# Patient Record
Sex: MALE | Race: WHITE | NOT HISPANIC OR LATINO | Employment: OTHER | URBAN - METROPOLITAN AREA
[De-identification: names, ages, dates, MRNs, and addresses within clinical notes are randomized per-mention and may not be internally consistent; named-entity substitution may affect disease eponyms.]

---

## 2017-03-30 ENCOUNTER — HOSPITAL ENCOUNTER (OUTPATIENT)
Dept: RADIOLOGY | Facility: HOSPITAL | Age: 67
Discharge: HOME | End: 2017-03-30
Attending: ORTHOPAEDIC SURGERY
Payer: MEDICARE

## 2017-03-30 DIAGNOSIS — M25.562 LEFT KNEE PAIN: ICD-10-CM

## 2017-03-30 PROCEDURE — 73564 X-RAY EXAM KNEE 4 OR MORE: CPT | Mod: TC,LT

## 2017-04-03 ENCOUNTER — HOSPITAL ENCOUNTER (OUTPATIENT)
Dept: RADIOLOGY | Facility: HOSPITAL | Age: 67
Discharge: HOME | End: 2017-04-03
Attending: ORTHOPAEDIC SURGERY
Payer: MEDICARE

## 2017-04-03 DIAGNOSIS — M17.12 UNILATERAL PRIMARY OSTEOARTHRITIS, LEFT KNEE: ICD-10-CM

## 2017-04-03 PROCEDURE — 73721 MRI JNT OF LWR EXTRE W/O DYE: CPT | Mod: TC,LT

## 2017-04-03 PROCEDURE — 73721 MRI JNT OF LWR EXTRE W/O DYE: CPT | Mod: 26,LT,, | Performed by: RADIOLOGY

## 2017-05-02 ENCOUNTER — HOSPITAL ENCOUNTER (OUTPATIENT)
Dept: PREADMISSION TESTING | Facility: HOSPITAL | Age: 67
Discharge: HOME | End: 2017-05-02
Attending: ORTHOPAEDIC SURGERY
Payer: MEDICARE

## 2017-05-02 ENCOUNTER — ANESTHESIA EVENT (OUTPATIENT)
Dept: SURGERY | Facility: HOSPITAL | Age: 67
End: 2017-05-02
Payer: MEDICARE

## 2017-05-02 VITALS — WEIGHT: 276 LBS | BODY MASS INDEX: 39.51 KG/M2 | HEIGHT: 70 IN

## 2017-05-02 NOTE — DISCHARGE INSTRUCTIONS
[facility]  Pre Admit Instructions    Day and Date of Procedure: Thursday 5-      · Call your doctor if you become ill before your surgery  · Someone will call you between 1 p.m. And 5 p.m.the workday before the procedure to give you an arrival time       - Before 7 a.m. Enter through Emergency Room       - 7 a.m. To 5 p.m. Enter through Patient Registration Main Lobby  · You must have a responsible  to bring you home    Do NOT eat or drink anything   past midnight before your procedure day    Please    · Do not wear makeup, jewelry, nail polish or body piercings  · Bring containers/solution for contacts, dentures, bridges - these and hearing aids will be removed before your procedure  · Do not bring cash, jewelry or valuables the day of your procedure   · No smoking at least 24 hours before your procedure  · Wear clothing that is comfortable and easy to take off and put on  · Do NOT shave for at least 5 days before your surgery    Review skin preparation handout before using.                 Information about your stay (Please Review)    Before Surgery  1. Cafeteria Meals: 7am to 10am; 11am to 1:30 pm; Dinner/Supper must may be ordered between 11:00 am and 4 pm from the [facility] After [facility] Menu. Food will be available to  between 5 pm and 6 pm. The kitchen phone extension is [phone].  2. Your doctor may order and review labs, x-rays, ECG or other tests as a pre-surgery workup and will call you if there is need for follow up.  3. No smoking inside or outside the hospital on hospital grounds.  4. Wear clothing that is easy to take off and put on.  The hospital will provide you with a gown.  5. You may bring robe, slippers, nightwear, and toiletries (toothbrush, toothpaste, makeup).  6. If your doctor orders a Fleets Enema or other prep, follow package and/or doctors orders.  7. Brush your teeth and rinse your mouth the morning of surgery, but dont swallow the water.  8. The nurse  will ask questions and check your condition.  The doctor may ryder your surgical site.  9. Compression boots may be put on your calves to reduce the risk of blood clots.  10. The doctor may order medicine to help you relax before surgery.  After Surgery  1. The nurse will check your temperature, breathing, blood pressure, heart rate, IV site, and surgery site.  2. A diet will be ordered-most start with ice chips and then advance slowly to other foods.  3. If you have IV fluids the IV pump will beep to let the nurse know that she needs to check it.  4. You may have a urinary catheter and staff may measure your oral intake and urine output.  5. Pain medication may be ordered by the doctor after surgery.  If you have a pain management device tell your caretakers not to press the button because of OVERDOSE RISK.  6. When the nurse or doctor tells you it is okay to get out of bed, ask for help until you are stable.  7. The nurse may ask you to turn, cough, and deep breathe to prevent lung problems.  You can use a pillow to hold your incision when you deep breathe or cough to reduce pain.  8. The nurse will give you discharge instructions--incision care, symptoms to report to your doctor, and your follow-up appointment when you are discharged.  You cannot drive yourself home.  Goal for Discharge from One Day Surgery  · Control pain with an oral medication  · Walk without feeling dizzy or weak  · Tolerate liquids well  · Urinate without difficulty    Things you can do to  Reduce the Risk of Infections or Complications  Wash Hands and use Waterless Hand Sanitizers  · Wash hands frequently with soap and warm water for at least 15 seconds.   · Use hand sanitizers (alcohol based) often at home and in public if hands are not visibly soiled  Take Antibiotic Exactly as Prescribed  · Do not stop antibiotics too soon; you risk developing infection resistant to antibiotics  · Take your antibiotic even if you are feeling better and  even if they upset your stomach  · Call the doctor if you cant tolerate the antibiotic or you have an allergic reaction  Stay Healthy  · Take medicines as prescribed by your doctor  · Keep your diabetes under control - diet and medication  · Get enough rest, exercise and eat a healthy diet  Keep the Wound Clean and Dry  · Wash hands before and after taking care of the incision (cut)  · Wash hands when you remove a dressing, before you touch/apply a new dressing  · Shower and clean incision with antibacterial soap and rinse well if the doctor approves  · Allow the cut to dry completely before putting on a clean dressing  · Do not touch the part of the bandage that will cover the incision  · Do not use ointments unless your doctor tells you to-can promote bacterial growth  · If ordered, put ointment directly on the dressing-do not touch the end of the tube  · Do not scrub, remove scabs, or leave a damp dressing on the incision  · Do not use peroxide or alcohol to clean the incision unless the doctor tells you to   · Do not let children, pets or anyone else contaminate the incision  Stop Smoking To Prevent Infection  · Stop smoking-Centers for Disease Control recommends 30 days before surgery  · Smokers get more infections after surgery-studies have shown 6 times the risk  · Smokers have more scarring and heal slower-open wounds get infected easier  Prevent Respiratory complications  · Stop smoking  · Turn, cough, and deep breathe even if you have some pain when you do so.  · Splint your incision with a pillow when you cough/deep breath, to help control pain.  · Do not lie in one position for long periods of time.   Prevent Blood Clots  · When you wake move your legs, flex your feet, rotate your ankles, wiggle your toes  · Get up when the doctor says its ok.  Dangle your feet from the side of the bed  · Report symptoms-leg pain, redness/swelling, warm to touch; fever; shortness of breath, chest pain, severe upper  back pain.

## 2017-05-02 NOTE — ANESTHESIA PREPROCEDURE EVALUATION
05/02/2017  Daniel Miller is a 66 y.o., male.    Anesthesia Evaluation    I have reviewed the Patient Summary Reports.    I have reviewed the Nursing Notes.   I have reviewed the Medications.     Review of Systems  Anesthesia Hx:  No problems with previous Anesthesia    Social:  Non-Smoker, No Alcohol Use    Hematology/Oncology:  Hematology Normal   Oncology Normal     EENT/Dental:EENT/Dental Normal   Cardiovascular:   Exercise tolerance: good Hypertension, well controlled  Functional Capacity good / => 4 METS  Carotoid Artery Disease, s/p carotid endarectomy , Right stenosis is  s/p CEA%   Pulmonary:   Sleep Apnea, CPAP    Musculoskeletal:   Arthritis     Neurological:   CVA, no residual symptoms    Endocrine:   Diabetes, well controlled, type 2        Physical Exam  General:  Morbid Obesity    Airway/Jaw/Neck:  Airway Findings: Mouth Opening: Normal Tongue: Normal  General Airway Assessment: Adult  Mallampati: I  TM Distance: Normal, at least 6 cm      Dental:  Dental Findings: Upper Dentures, Lower Dentures        Mental Status:  Mental Status Findings:  Cooperative         Anesthesia Plan  Type of Anesthesia, risks & benefits discussed:  Anesthesia Type:  general  Patient's Preference:   Intra-op Monitoring Plan: standard ASA monitors  Intra-op Monitoring Plan Comments:   Post Op Pain Control Plan:   Post Op Pain Control Plan Comments:   Induction:   IV  Beta Blocker:  Patient is on a Beta-Blocker and has received one dose within the past 24 hours (No further documentation required).       Informed Consent: Patient understands risks and agrees with Anesthesia plan.  Questions answered. Anesthesia consent signed with patient.  ASA Score: 3     Day of Surgery Review of History & Physical: I have interviewed and examined the patient. I have reviewed the patient's H&P dated: 5/11/17. There are no  significant changes.  H&P update referred to the surgeon.         Ready For Surgery From Anesthesia Perspective.

## 2017-05-02 NOTE — IP AVS SNAPSHOT
[address] [address]  Phone: [phone]           Patient Discharge Instructions   Our goal is to set you up for success. This packet includes information on your condition, medications, and your home care.  It will help you care for yourself to prevent having to return to the hospital.     Please ask your nurse if you have any questions.      There are many details to remember when preparing to leave the hospital. Here is what you will need to do:    1. Take your medicine. If you are prescribed medications, review your Medication List on the following pages. You may have new medications to  at the pharmacy and others that you'll need to stop taking. Review the instructions for how and when to take your medications. Talk with your doctor or nurses if you are unsure of what to do.     2. Go to your follow-up appointments. Specific follow-up information is listed in the following pages. Your may be contacted by a nurse or clinical provider about future appointments. Be sure we have all of the phone numbers to reach you. Please contact your provider's office if you are unable to make an appointment.     3. Watch for warning signs. Your doctor or nurse will give you detailed warning signs to watch for and when to call for assistance. These instructions may also include educational information about your condition. If you experience any of warning signs to your health, call your doctor.               ** Verify the list of medication(s) below is accurate and up to date. Carry this with you in case of emergency. If your medications have changed, please notify your healthcare provider.             Medication List      TAKE these medications        Additional Info                      allopurinol 300 MG tablet   Commonly known as:  ZYLOPRIM   Refills:  5   Dose:  300 mg    Instructions:  Take 300 mg by mouth once daily.     Begin Date    AM    Noon    PM    Bedtime        aspirin 81 MG EC tablet   Commonly known as:  ECOTRIN   Refills:  0   Dose:  81 mg    Instructions:  Take 81 mg by mouth once daily.     Begin Date    AM    Noon    PM    Bedtime       fish oil-omega-3 fatty acids 300-1,000 mg capsule   Refills:  0   Dose:  2 g    Instructions:  Take 2 g by mouth 2 (two) times daily.     Begin Date    AM    Noon    PM    Bedtime       folic acid 1 MG tablet   Commonly known as:  FOLVITE   Refills:  4   Dose:  1000 mcg    Instructions:  Take 1,000 mcg by mouth once daily.     Begin Date    AM    Noon    PM    Bedtime       furosemide 20 MG tablet   Commonly known as:  LASIX   Refills:  5   Dose:  20 mg    Instructions:  Take 20 mg by mouth 2 (two) times daily.     Begin Date    AM    Noon    PM    Bedtime       gabapentin 300 MG capsule   Commonly known as:  NEURONTIN   Refills:  0   Dose:  300 mg    Instructions:  Take 300 mg by mouth 3 (three) times daily.     Begin Date    AM    Noon    PM    Bedtime       glimepiride 2 MG tablet   Commonly known as:  AMARYL   Refills:  0   Dose:  2 mg    Instructions:  Take 2 mg by mouth before breakfast.     Begin Date    AM    Noon    PM    Bedtime       meclizine 25 mg tablet   Commonly known as:  ANTIVERT   Refills:  3   Dose:  25 mg    Instructions:  Take 25 mg by mouth every evening.     Begin Date    AM    Noon    PM    Bedtime       metoprolol succinate 100 MG 24 hr tablet   Commonly known as:  TOPROL-XL   Refills:  0   Dose:  100 mg   What changed:  Another medication with the same name was removed. Continue taking this medication, and follow the directions you see here.    Instructions:  Take 100 mg by mouth once daily.     Begin Date    AM    Noon    PM    Bedtime       pantoprazole 40 MG tablet   Commonly known as:  PROTONIX   Refills:  0   Dose:  40 mg    Instructions:  Take 40 mg by mouth once daily.     Begin Date    AM    Noon    PM    Bedtime       phenazopyridine 100 MG tablet   Commonly known as:  PYRIDIUM   Quantity:  20  tablet   Refills:  0   Dose:  100 mg    Instructions:  Take 1 tablet (100 mg total) by mouth 3 (three) times daily with meals.     Begin Date    AM    Noon    PM    Bedtime       potassium citrate 15 mEq Tbsr   Quantity:  60 tablet   Refills:  6   Dose:  1 tablet    Instructions:  Take 1 tablet by mouth 2 (two) times daily with meals.     Begin Date    AM    Noon    PM    Bedtime       pramipexole 0.125 MG tablet   Commonly known as:  MIRAPEX   Refills:  5   Dose:  0.125 mg    Instructions:  Take 0.125 mg by mouth nightly as needed.     Begin Date    AM    Noon    PM    Bedtime       ramipril 2.5 MG capsule   Commonly known as:  ALTACE   Refills:  5   Dose:  2.5 mg    Instructions:  Take 2.5 mg by mouth 2 (two) times daily.     Begin Date    AM    Noon    PM    Bedtime       rosuvastatin 20 MG tablet   Commonly known as:  CRESTOR   Refills:  0   Dose:  20 mg    Instructions:  Take 20 mg by mouth once daily.     Begin Date    AM    Noon    PM    Bedtime       SLOW-MAG ORAL   Refills:  0   Dose:  1 tablet    Instructions:  Take 1 tablet by mouth once daily.     Begin Date    AM    Noon    PM    Bedtime       tamsulosin 0.4 mg Cp24   Commonly known as:  FLOMAX   Quantity:  30 capsule   Refills:  5   Dose:  0.4 mg    Instructions:  Take 1 capsule (0.4 mg total) by mouth once daily.     Begin Date    AM    Noon    PM    Bedtime       tramadol 50 mg tablet   Commonly known as:  ULTRAM   Quantity:  120 tablet   Refills:  1    Instructions:  TAKE 1 TABLET BY MOUTH EVERY 6 HOURS AS NEEDED FOR PAIN     Begin Date    AM    Noon    PM    Bedtime                  Please bring to all follow up appointments:    1. A copy of your discharge instructions.  2. All medicines you are currently taking in their original bottles.  3. Identification and insurance card.    Please arrive 15 minutes ahead of scheduled appointment time.    Please call 24 hours in advance if you must reschedule your appointment and/or time.        Your Scheduled  Appointments     Sep 05, 2017  3:05 PM CDT   Non-Fasting Lab with LAB, [facility] REYES Brown Jo [facility] ([facility])    [address] [facility] [address]   [phone]            Sep 05, 2017  3:30 PM CDT   Established Patient Visit with MD Reyes Arriola [facility] ([facility])    [address]  [facility] [id number] [phone]              Your Future Surgeries/Procedures     May 11, 2017   Surgery with Dorian Be MD   [facility] ([facility])    [address] [place] [id number] [phone]                  Discharge Instructions       [facility]  Pre Admit Instructions    Day and Date of Procedure: Thursday 5-      · Call your doctor if you become ill before your surgery  · Someone will call you between 1 p.m. And 5 p.m.the workday before the procedure to give you an arrival time       - Before 7 a.m. Enter through Emergency Room       - 7 a.m. To 5 p.m. Enter through Patient Registration Main Lobby  · You must have a responsible  to bring you home    Do NOT eat or drink anything   past midnight before your procedure day    Please    · Do not wear makeup, jewelry, nail polish or body piercings  · Bring containers/solution for contacts, dentures, bridges - these and hearing aids will be removed before your procedure  · Do not bring cash, jewelry or valuables the day of your procedure   · No smoking at least 24 hours before your procedure  · Wear clothing that is comfortable and easy to take off and put on  · Do NOT shave for at least 5 days before your surgery    Review skin preparation handout before using.                 Information about your stay (Please Review)    Before Surgery  1. Cafeteria Meals: 7am to 10am; 11am to 1:30 pm; Dinner/Supper must may be ordered between 11:00 am and 4 pm from the [facility] Menu. Food will be available to   between 5 pm and 6 pm. The kitchen phone extension is [phone].  2. Your doctor may order and review labs, x-rays, ECG or other tests as a pre-surgery workup and will call you if there is need for follow up.  3. No smoking inside or outside the hospital on hospital grounds.  4. Wear clothing that is easy to take off and put on.  The hospital will provide you with a gown.  5. You may bring robe, slippers, nightwear, and toiletries (toothbrush, toothpaste, makeup).  6. If your doctor orders a Fleets Enema or other prep, follow package and/or doctors orders.  7. Brush your teeth and rinse your mouth the morning of surgery, but dont swallow the water.  8. The nurse will ask questions and check your condition.  The doctor may ryder your surgical site.  9. Compression boots may be put on your calves to reduce the risk of blood clots.  10. The doctor may order medicine to help you relax before surgery.  After Surgery  1. The nurse will check your temperature, breathing, blood pressure, heart rate, IV site, and surgery site.  2. A diet will be ordered-most start with ice chips and then advance slowly to other foods.  3. If you have IV fluids the IV pump will beep to let the nurse know that she needs to check it.  4. You may have a urinary catheter and staff may measure your oral intake and urine output.  5. Pain medication may be ordered by the doctor after surgery.  If you have a pain management device tell your caretakers not to press the button because of OVERDOSE RISK.  6. When the nurse or doctor tells you it is okay to get out of bed, ask for help until you are stable.  7. The nurse may ask you to turn, cough, and deep breathe to prevent lung problems.  You can use a pillow to hold your incision when you deep breathe or cough to reduce pain.  8. The nurse will give you discharge instructions--incision care, symptoms to report to your doctor, and your follow-up appointment when you are discharged.  You cannot drive  yourself home.  Goal for Discharge from One Day Surgery  · Control pain with an oral medication  · Walk without feeling dizzy or weak  · Tolerate liquids well  · Urinate without difficulty    Things you can do to  Reduce the Risk of Infections or Complications  Wash Hands and use Waterless Hand Sanitizers  · Wash hands frequently with soap and warm water for at least 15 seconds.   · Use hand sanitizers (alcohol based) often at home and in public if hands are not visibly soiled  Take Antibiotic Exactly as Prescribed  · Do not stop antibiotics too soon; you risk developing infection resistant to antibiotics  · Take your antibiotic even if you are feeling better and even if they upset your stomach  · Call the doctor if you cant tolerate the antibiotic or you have an allergic reaction  Stay Healthy  · Take medicines as prescribed by your doctor  · Keep your diabetes under control - diet and medication  · Get enough rest, exercise and eat a healthy diet  Keep the Wound Clean and Dry  · Wash hands before and after taking care of the incision (cut)  · Wash hands when you remove a dressing, before you touch/apply a new dressing  · Shower and clean incision with antibacterial soap and rinse well if the doctor approves  · Allow the cut to dry completely before putting on a clean dressing  · Do not touch the part of the bandage that will cover the incision  · Do not use ointments unless your doctor tells you to-can promote bacterial growth  · If ordered, put ointment directly on the dressing-do not touch the end of the tube  · Do not scrub, remove scabs, or leave a damp dressing on the incision  · Do not use peroxide or alcohol to clean the incision unless the doctor tells you to   · Do not let children, pets or anyone else contaminate the incision  Stop Smoking To Prevent Infection  · Stop smoking-Centers for Disease Control recommends 30 days before surgery  · Smokers get more infections after surgery-studies have shown 6  "times the risk  · Smokers have more scarring and heal slower-open wounds get infected easier  Prevent Respiratory complications  · Stop smoking  · Turn, cough, and deep breathe even if you have some pain when you do so.  · Splint your incision with a pillow when you cough/deep breath, to help control pain.  · Do not lie in one position for long periods of time.   Prevent Blood Clots  · When you wake move your legs, flex your feet, rotate your ankles, wiggle your toes  · Get up when the doctor says its ok.  Dangle your feet from the side of the bed  · Report symptoms-leg pain, redness/swelling, warm to touch; fever; shortness of breath, chest pain, severe upper back pain.        Admission Information     Date & Time Provider Department CSN    5/2/2017 12:00 PM Dorian Be MD [facility] [id number]      Care Providers     Provider Role Specialty Primary office phone    Dorian Be MD Attending Provider Orthopedic Surgery [phone]      Your Vitals Were     Height Weight BMI          5' 10" (1.778 m) 125.2 kg (276 lb) 39.6 kg/m2        Recent Lab Values     No lab values to display.      Allergies as of 5/2/2017        Reactions    Lipitor [Atorvastatin] Hives      [facility]     [facility] Nurse Care Line - 24/7 Assistance  Unless otherwise directed by your provider, please contact [facility] On[facility], our nurse care line that is available for 24/7 assistance.     Registered nurses in the [facility] Center provide clinical advisement, health education, appointment booking, and other advisory services.  Call for this free service at [phone].        Advance Directives     An advance directive is a document which, in the event you are no longer able to make decisions for yourself, tells your healthcare team what kind of treatment you do or do not want to receive, or who you would like to make those decisions for you.  If you do not currently have an advance directive, [facility] " encourages you to create one.  For more information call:  [phone]), [phone] ([phone]),  or log on to [email or web address].        Language Assistance Services     ATTENTION: Language assistance services are available, free of charge. Please call [phone].      ATENCIÓN: Si habla abdulkadir, tiene a ramirez disposición servicios gratuitos de asistencia lingüística. Llame al [phone].     [facility] Ý: N?u b?n nói Ti?ng Vi?t, có các d?ch v? h? tr? ngôn ng? mi?n phí dành cho b?n. G?i s? [phone].        Chronic Kindey Disease Education             [facility] Sign-Up     Activating your [facility] account is as easy as 1-2-3!     1) Visit [email or web address], select Sign Up Now, enter this activation code and your date of birth, then select Next.  [email or web address]  Expires: 6/16/2017 12:00 PM      2) Create a username and password to use when you visit [facility] in the future and select a security question in case you lose your password and select Next.    3) Enter your e-mail address and click Sign Up!    Additional Information  If you have questions, please e-mail [email or web address] or call [phone] to talk to our [facility] staff. Remember, [facility] is NOT to be used for urgent needs. For medical emergencies, dial 911.          [facility] complies with applicable Federal civil rights laws and does not discriminate on the basis of race, color, national origin, age, disability, or sex.

## 2017-05-11 ENCOUNTER — ANESTHESIA (OUTPATIENT)
Dept: SURGERY | Facility: HOSPITAL | Age: 67
End: 2017-05-11
Payer: MEDICARE

## 2017-05-11 ENCOUNTER — HOSPITAL ENCOUNTER (OUTPATIENT)
Facility: HOSPITAL | Age: 67
Discharge: HOME | End: 2017-05-11
Attending: ORTHOPAEDIC SURGERY | Admitting: ORTHOPAEDIC SURGERY
Payer: MEDICARE

## 2017-05-11 DIAGNOSIS — S83.232A COMPLEX TEAR OF MEDIAL MENISCUS, CURRENT INJURY, LEFT KNEE, INITIAL ENCOUNTER: Primary | ICD-10-CM

## 2017-05-11 PROCEDURE — 01400 ANES OPN/ARTHRS KNEE JT NOS: CPT | Mod: P3,QZ,QS | Performed by: NURSE ANESTHETIST, CERTIFIED REGISTERED

## 2017-05-11 PROCEDURE — 27000496 *HC LARYNGEAL BLADE (STAH ONLY): Performed by: NURSE ANESTHETIST, CERTIFIED REGISTERED

## 2017-05-11 PROCEDURE — 25000003 PHARM REV CODE 250: Performed by: NURSE ANESTHETIST, CERTIFIED REGISTERED

## 2017-05-11 PROCEDURE — 27200120 HC KIT IV START (RUSH ONLY): Performed by: NURSE ANESTHETIST, CERTIFIED REGISTERED

## 2017-05-11 PROCEDURE — 37000009 HC ANESTHESIA EA ADD 15 MINS: Performed by: ORTHOPAEDIC SURGERY

## 2017-05-11 PROCEDURE — 27201423 OPTIME MED/SURG SUP & DEVICES STERILE SUPPLY: Performed by: ORTHOPAEDIC SURGERY

## 2017-05-11 PROCEDURE — 36000710: Performed by: ORTHOPAEDIC SURGERY

## 2017-05-11 PROCEDURE — 37000008 HC ANESTHESIA 1ST 15 MINUTES: Performed by: ORTHOPAEDIC SURGERY

## 2017-05-11 PROCEDURE — 63600175 PHARM REV CODE 636 W HCPCS: Performed by: NURSE ANESTHETIST, CERTIFIED REGISTERED

## 2017-05-11 PROCEDURE — 36000711: Performed by: ORTHOPAEDIC SURGERY

## 2017-05-11 PROCEDURE — 71000033 HC RECOVERY, INTIAL HOUR: Performed by: ORTHOPAEDIC SURGERY

## 2017-05-11 PROCEDURE — 27000495 *HC ANESTHESIA START UP SUPPLY KIT (STAH ONLY): Performed by: NURSE ANESTHETIST, CERTIFIED REGISTERED

## 2017-05-11 PROCEDURE — 27000494 *HC OXYGEN SET UP (STAH ONLY): Performed by: NURSE ANESTHETIST, CERTIFIED REGISTERED

## 2017-05-11 PROCEDURE — 25000003 PHARM REV CODE 250: Performed by: ORTHOPAEDIC SURGERY

## 2017-05-11 RX ORDER — HYDROCODONE BITARTRATE AND ACETAMINOPHEN 5; 325 MG/1; MG/1
1 TABLET ORAL EVERY 4 HOURS PRN
Status: DISCONTINUED | OUTPATIENT
Start: 2017-05-11 | End: 2017-05-11 | Stop reason: HOSPADM

## 2017-05-11 RX ORDER — MIDAZOLAM HYDROCHLORIDE 1 MG/ML
INJECTION, SOLUTION INTRAMUSCULAR; INTRAVENOUS
Status: DISCONTINUED | OUTPATIENT
Start: 2017-05-11 | End: 2017-05-11

## 2017-05-11 RX ORDER — SUCCINYLCHOLINE CHLORIDE 20 MG/ML
INJECTION INTRAMUSCULAR; INTRAVENOUS
Status: DISCONTINUED | OUTPATIENT
Start: 2017-05-11 | End: 2017-05-11

## 2017-05-11 RX ORDER — PROPOFOL 10 MG/ML
INJECTION, EMULSION INTRAVENOUS
Status: DISCONTINUED | OUTPATIENT
Start: 2017-05-11 | End: 2017-05-11

## 2017-05-11 RX ORDER — SODIUM CHLORIDE, SODIUM LACTATE, POTASSIUM CHLORIDE, CALCIUM CHLORIDE 600; 310; 30; 20 MG/100ML; MG/100ML; MG/100ML; MG/100ML
INJECTION, SOLUTION INTRAVENOUS CONTINUOUS PRN
Status: DISCONTINUED | OUTPATIENT
Start: 2017-05-11 | End: 2017-05-11

## 2017-05-11 RX ORDER — FENTANYL CITRATE 50 UG/ML
INJECTION, SOLUTION INTRAMUSCULAR; INTRAVENOUS
Status: DISCONTINUED | OUTPATIENT
Start: 2017-05-11 | End: 2017-05-11

## 2017-05-11 RX ORDER — BUPIVACAINE HYDROCHLORIDE AND EPINEPHRINE 5; 5 MG/ML; UG/ML
INJECTION, SOLUTION EPIDURAL; INTRACAUDAL; PERINEURAL
Status: DISCONTINUED | OUTPATIENT
Start: 2017-05-11 | End: 2017-05-11 | Stop reason: HOSPADM

## 2017-05-11 RX ORDER — LIDOCAINE HCL/PF 100 MG/5ML
SYRINGE (ML) INTRAVENOUS
Status: DISCONTINUED | OUTPATIENT
Start: 2017-05-11 | End: 2017-05-11

## 2017-05-11 RX ORDER — ONDANSETRON 2 MG/ML
INJECTION INTRAMUSCULAR; INTRAVENOUS
Status: DISCONTINUED | OUTPATIENT
Start: 2017-05-11 | End: 2017-05-11

## 2017-05-11 RX ORDER — ONDANSETRON 2 MG/ML
4 INJECTION INTRAMUSCULAR; INTRAVENOUS EVERY 12 HOURS PRN
Status: DISCONTINUED | OUTPATIENT
Start: 2017-05-11 | End: 2017-05-11 | Stop reason: HOSPADM

## 2017-05-11 RX ORDER — HYDROMORPHONE HYDROCHLORIDE 2 MG/ML
INJECTION, SOLUTION INTRAMUSCULAR; INTRAVENOUS; SUBCUTANEOUS
Status: DISCONTINUED | OUTPATIENT
Start: 2017-05-11 | End: 2017-05-11

## 2017-05-11 RX ADMIN — EPHEDRINE SULFATE 5 MG: 50 INJECTION INTRAMUSCULAR; INTRAVENOUS; SUBCUTANEOUS at 09:05

## 2017-05-11 RX ADMIN — LIDOCAINE HYDROCHLORIDE 100 MG: 20 INJECTION, SOLUTION INTRAVENOUS at 08:05

## 2017-05-11 RX ADMIN — ONDANSETRON 4 MG: 2 INJECTION, SOLUTION INTRAMUSCULAR; INTRAVENOUS at 09:05

## 2017-05-11 RX ADMIN — FENTANYL CITRATE 100 MCG: 50 INJECTION, SOLUTION INTRAMUSCULAR; INTRAVENOUS at 08:05

## 2017-05-11 RX ADMIN — CEFAZOLIN 2 G: 1 INJECTION, POWDER, FOR SOLUTION INTRAVENOUS at 08:05

## 2017-05-11 RX ADMIN — PROPOFOL 200 MG: 10 INJECTION, EMULSION INTRAVENOUS at 08:05

## 2017-05-11 RX ADMIN — HYDROMORPHONE HYDROCHLORIDE 0.4 MG: 2 INJECTION, SOLUTION INTRAMUSCULAR; INTRAVENOUS; SUBCUTANEOUS at 09:05

## 2017-05-11 RX ADMIN — EPHEDRINE SULFATE 5 MG: 50 INJECTION INTRAMUSCULAR; INTRAVENOUS; SUBCUTANEOUS at 08:05

## 2017-05-11 RX ADMIN — EPHEDRINE SULFATE 10 MG: 50 INJECTION INTRAMUSCULAR; INTRAVENOUS; SUBCUTANEOUS at 08:05

## 2017-05-11 RX ADMIN — MIDAZOLAM 2 MG: 1 INJECTION INTRAMUSCULAR; INTRAVENOUS at 08:05

## 2017-05-11 RX ADMIN — SODIUM CHLORIDE, SODIUM LACTATE, POTASSIUM CHLORIDE, AND CALCIUM CHLORIDE: .6; .31; .03; .02 INJECTION, SOLUTION INTRAVENOUS at 08:05

## 2017-05-11 RX ADMIN — SUCCINYLCHOLINE CHLORIDE 140 MG: 20 INJECTION, SOLUTION INTRAMUSCULAR; INTRAVENOUS at 08:05

## 2017-05-11 RX ADMIN — EPHEDRINE SULFATE 15 MG: 50 INJECTION INTRAMUSCULAR; INTRAVENOUS; SUBCUTANEOUS at 08:05

## 2017-05-11 NOTE — IP AVS SNAPSHOT
[address] [address]  Phone: [phone]           Patient Discharge Instructions   Our goal is to set you up for success. This packet includes information on your condition, medications, and your home care.  It will help you care for yourself to prevent having to return to the hospital.     Please ask your nurse if you have any questions.      There are many details to remember when preparing to leave the hospital. Here is what you will need to do:    1. Take your medicine. If you are prescribed medications, review your Medication List on the following pages. You may have new medications to  at the pharmacy and others that you'll need to stop taking. Review the instructions for how and when to take your medications. Talk with your doctor or nurses if you are unsure of what to do.     2. Go to your follow-up appointments. Specific follow-up information is listed in the following pages. Your may be contacted by a nurse or clinical provider about future appointments. Be sure we have all of the phone numbers to reach you. Please contact your provider's office if you are unable to make an appointment.     3. Watch for warning signs. Your doctor or nurse will give you detailed warning signs to watch for and when to call for assistance. These instructions may also include educational information about your condition. If you experience any of warning signs to your health, call your doctor.               ** Verify the list of medication(s) below is accurate and up to date. Carry this with you in case of emergency. If your medications have changed, please notify your healthcare provider.             Medication List      CONTINUE taking these medications        Additional Info                      allopurinol 300 MG tablet   Commonly known as:  ZYLOPRIM   Refills:  5   Dose:  300 mg    Instructions:  Take 300 mg by mouth once daily.     Begin Date    AM    Noon    PM     Bedtime       aspirin 81 MG EC tablet   Commonly known as:  ECOTRIN   Refills:  0   Dose:  81 mg    Instructions:  Take 81 mg by mouth once daily.     Begin Date    AM    Noon    PM    Bedtime       fish oil-omega-3 fatty acids 300-1,000 mg capsule   Refills:  0   Dose:  2 g    Instructions:  Take 2 g by mouth 2 (two) times daily.     Begin Date    AM    Noon    PM    Bedtime       folic acid 1 MG tablet   Commonly known as:  FOLVITE   Refills:  4   Dose:  1000 mcg    Instructions:  Take 1,000 mcg by mouth once daily.     Begin Date    AM    Noon    PM    Bedtime       furosemide 20 MG tablet   Commonly known as:  LASIX   Refills:  5   Dose:  20 mg    Instructions:  Take 20 mg by mouth 2 (two) times daily.     Begin Date    AM    Noon    PM    Bedtime       gabapentin 300 MG capsule   Commonly known as:  NEURONTIN   Refills:  0   Dose:  300 mg    Instructions:  Take 300 mg by mouth 3 (three) times daily.     Begin Date    AM    Noon    PM    Bedtime       glimepiride 2 MG tablet   Commonly known as:  AMARYL   Refills:  0   Dose:  2 mg    Instructions:  Take 2 mg by mouth before breakfast.     Begin Date    AM    Noon    PM    Bedtime       meclizine 25 mg tablet   Commonly known as:  ANTIVERT   Refills:  3   Dose:  25 mg    Instructions:  Take 25 mg by mouth every evening.     Begin Date    AM    Noon    PM    Bedtime       metoprolol succinate 100 MG 24 hr tablet   Commonly known as:  TOPROL-XL   Refills:  0   Dose:  100 mg    Instructions:  Take 100 mg by mouth once daily.     Begin Date    AM    Noon    PM    Bedtime       pantoprazole 40 MG tablet   Commonly known as:  PROTONIX   Refills:  0   Dose:  40 mg    Instructions:  Take 40 mg by mouth once daily.     Begin Date    AM    Noon    PM    Bedtime       phenazopyridine 100 MG tablet   Commonly known as:  PYRIDIUM   Quantity:  20 tablet   Refills:  0   Dose:  100 mg    Instructions:  Take 1 tablet (100 mg total) by mouth 3 (three) times daily with meals.      Begin Date    AM    Noon    PM    Bedtime       pramipexole 0.125 MG tablet   Commonly known as:  MIRAPEX   Refills:  5   Dose:  0.125 mg    Instructions:  Take 0.125 mg by mouth nightly as needed.     Begin Date    AM    Noon    PM    Bedtime       ramipril 2.5 MG capsule   Commonly known as:  ALTACE   Refills:  5   Dose:  2.5 mg    Instructions:  Take 2.5 mg by mouth 2 (two) times daily.     Begin Date    AM    Noon    PM    Bedtime       rosuvastatin 20 MG tablet   Commonly known as:  CRESTOR   Refills:  0   Dose:  20 mg    Instructions:  Take 20 mg by mouth once daily.     Begin Date    AM    Noon    PM    Bedtime       SLOW-MAG ORAL   Refills:  0   Dose:  1 tablet    Instructions:  Take 1 tablet by mouth once daily.     Begin Date    AM    Noon    PM    Bedtime       tamsulosin 0.4 mg Cp24   Commonly known as:  FLOMAX   Quantity:  30 capsule   Refills:  5   Dose:  0.4 mg    Instructions:  Take 1 capsule (0.4 mg total) by mouth once daily.     Begin Date    AM    Noon    PM    Bedtime       tramadol 50 mg tablet   Commonly known as:  ULTRAM   Quantity:  120 tablet   Refills:  1    Instructions:  TAKE 1 TABLET BY MOUTH EVERY 6 HOURS AS NEEDED FOR PAIN     Begin Date    AM    Noon    PM    Bedtime         ASK your doctor about these medications        Additional Info                      potassium citrate 15 mEq Tbsr   Quantity:  60 tablet   Refills:  6   Dose:  1 tablet    Instructions:  Take 1 tablet by mouth 2 (two) times daily with meals.     Begin Date    AM    Noon    PM    Bedtime                  Please bring to all follow up appointments:    1. A copy of your discharge instructions.  2. All medicines you are currently taking in their original bottles.  3. Identification and insurance card.    Please arrive 15 minutes ahead of scheduled appointment time.    Please call 24 hours in advance if you must reschedule your appointment and/or time.        Your Scheduled Appointments     May 23, 2017 10:45 AM CDT    Mri T Spine Non Cont with STAH MRI1   [facility] ([facility])    [address] [facility] [address]   [phone]            Sep 05, 2017  3:05 PM CDT   Non-Fasting Lab with LAB, [facility] REYES BIRD   Reyes Bird Osorio [facility] ([facility])    [address] [facility] [address]   [phone]            Sep 05, 2017  3:30 PM CDT   Established Patient Visit with MD Reyes Arriola [facility] ([facility])    [address] [address] [phone]              Follow-up Information     Follow up with Dorian Be MD On 5/25/2017.    Specialty:  Orthopedic Surgery    Why:  Post op F/U at 9:10am    Contact information:    [address]  [address]  [facility] [address]  [phone]          Discharge Instructions     Future Orders    Call MD for:  difficulty breathing, headache or visual disturbances     Call MD for:  extreme fatigue     Call MD for:  hives     Call MD for:  persistent dizziness or light-headedness     Call MD for:  persistent nausea and vomiting     Call MD for:  redness, tenderness, or signs of infection (pain, swelling, redness, odor or green/yellow discharge around incision site)     Call MD for:  severe uncontrolled pain     Call MD for:  temperature >100.4     Diet general     Questions:    Total calories:      Fat restriction, if any:      Protein restriction, if any:      Na restriction, if any:      Fluid restriction:      Additional restrictions:      Remove dressing in 48 hours     Shower on day dressing removed (No bath)       Discharge References/Attachments     MENISCUS PROBLEMS, TREATING (ENGLISH)    MENISCAL TEARS, UNDERSTANDING (ENGLISH)        Admission Information     Date & Time Provider Department CSN    5/11/2017  5:59 AM Dorian Be MD [facility] [id number]      Care Providers     Provider Role Specialty Primary office phone    Dorian FALCON  "MD Haile Attending Provider Orthopedic Surgery [phone]    Dorian Be MD Surgeon  Orthopedic Surgery [phone]      Your Vitals Were     BP Pulse Temp Resp Height Weight    106/64 61 97.1 °F (36.2 °C) (Oral) 18 5' 10" (1.778 m) 124.1 kg (273 lb 9.5 oz)    SpO2 BMI             98% 39.26 kg/m2         Recent Lab Values     No lab values to display.      Allergies as of 5/11/2017        Reactions    Lipitor [Atorvastatin] Hives      [facility]     [facility] Nurse Care Line - 24/7 Assistance  Unless otherwise directed by your provider, please contact [facility], our nurse care line that is available for 24/7 assistance.     Registered nurses in the [facility] provide clinical advisement, health education, appointment booking, and other advisory services.  Call for this free service at [phone].        Advance Directives     An advance directive is a document which, in the event you are no longer able to make decisions for yourself, tells your healthcare team what kind of treatment you do or do not want to receive, or who you would like to make those decisions for you.  If you do not currently have an advance directive, [facility] encourages you to create one.  For more information call:  [phone]), [phone] ([phone]),  or log on to [email or web address].        Smoking Cessation     If you would like to quit smoking:   You may be eligible for free services if you are a [place] resident and started smoking cigarettes before September 1, 1988.  Call the Smoking Cessation Trust (SCT) toll free at (169) 072-5816 or (851) 736-3774.   Call 9-323-QUIT-NOW if you do not meet the above criteria.   Contact us via email: [email or web address]   View our website for more information: [email or web address]        Language Assistance Services     ATTENTION: Language assistance services are available, free of charge. Please call [phone].  "     ATENCIÓN: Si habla abdulkadir, tiene a ramirez disposición servicios gratuitos de asistencia lingüística. Llame al [phone].     [facility] Ý: N?u b?n nói Ti?ng Vi?t, có các d?ch v? h? tr? ngôn ng? mi?n phí dành cho b?n. G?i s? [phone].        Chronic Kindey Disease Education             [facility] Sign-Up     Activating your [facility] account is as easy as 1-2-3!     1) Visit [email or web address], select Sign Up Now, enter this activation code and your date of birth, then select Next.  [email or web address]  Expires: 6/16/2017 12:00 PM      2) Create a username and password to use when you visit [facility] in the future and select a security question in case you lose your password and select Next.    3) Enter your e-mail address and click Sign Up!    Additional Information  If you have questions, please e-mail [email or web address] or call [phone] to talk to our [facility] staff. Remember, [facility] is NOT to be used for urgent needs. For medical emergencies, dial 911.          [facility] complies with applicable Federal civil rights laws and does not discriminate on the basis of race, color, national origin, age, disability, or sex.

## 2017-05-11 NOTE — ANESTHESIA POSTPROCEDURE EVALUATION
"Anesthesia Post Evaluation    Patient: Daniel Miller    Procedure(s) Performed: Procedure(s) (LRB):  ARTHROSCOPY-KNEE (Left)  MEDIAL MENISCECTOMY (Left)    Final Anesthesia Type: general  Patient location during evaluation: PACU  Patient participation: Yes- Able to Participate  Level of consciousness: awake and alert and awake  Post-procedure vital signs: reviewed and stable  Pain management: adequate  Airway patency: patent  PONV status at discharge: No PONV  Anesthetic complications: no      Cardiovascular status: blood pressure returned to baseline, hemodynamically stable and stable  Respiratory status: unassisted, spontaneous ventilation and nasal cannula  Hydration status: euvolemic  Follow-up not needed.        Visit Vitals    BP (!) 113/55 (BP Location: Right arm, Patient Position: Lying, BP Method: Automatic)    Pulse 67    Temp 36.5 °C (97.7 °F) (Tympanic)    Resp 18    Ht 5' 10" (1.778 m)    Wt 124.1 kg (273 lb 9.5 oz)    SpO2 (!) 94%    BMI 39.26 kg/m2       Pain/Omar Score: Pain Assessment Performed: Yes (5/11/2017 10:08 AM)  Presence of Pain: denies (5/11/2017 10:08 AM)  Omar Score: 9 (5/11/2017  9:47 AM)      "

## 2017-05-11 NOTE — H&P
The patient has been examined and the H&P has been reviewed:  I concur with the findings and no changes have occurred since H&P was written.    Patient cleared for Anesthesia: General    Anesthesia/Surgery risks, benefits and alternative options discussed and understood by patient/family.    Active Hospital Problems    Diagnosis  POA    Complex tear of medial meniscus, current injury, left knee, initial encounter [S84.870A]  Yes      Resolved Hospital Problems    Diagnosis Date Resolved POA   No resolved problems to display.

## 2017-05-11 NOTE — NURSING TRANSFER
Nursing Transfer Note      5/11/2017     Transfer To: OR    Transfer via bed    Transfer with SCD to right leg    Transported by Tanja Agrawal, RN    Chart send with patient: Yes    Dentures out, wife at side

## 2017-05-11 NOTE — NURSING
Report received. Patient back in room from surgery; O2 sats 85 on room air, increased to 98 on 2L O2 via nasal canula. Otherwise, stable. Denies SOB. Denies pain. Family at bedside. Call bell within reach. Patient and family instructed with needs.

## 2017-05-11 NOTE — BRIEF OP NOTE
[facility]  Brief Operative Note     SUMMARY     Surgery Date: 5/11/2017     Surgeon(s) and Role:     * Dorian Be MD - Primary    Assisting Surgeon: None    Pre-op Diagnosis:  Complex tear of medial meniscus, current injury, left knee, initial encounter [S83.232A]    Post-op Diagnosis:  Post-Op Diagnosis Codes:     * Complex tear of medial meniscus, current injury, left knee, initial encounter [S83.232A]    Procedure(s) (LRB):  ARTHROSCOPY-KNEE (Left)  MEDIAL MENISCECTOMY (Left)    Anesthesia: General    Description of the findings of the procedure: OA, medial men tear    Findings/Key Components: same    Estimated Blood Loss: * No values recorded between 5/11/2017 12:00 AM and 5/11/2017  8:32 AM *         Specimens:   Specimen     None          Discharge Note    SUMMARY     Admit Date: 5/11/2017    Discharge Date and Time:  05/11/2017 8:32 AM    Hospital Course (synopsis of major diagnoses, care, treatment, and services provided during the course of the hospital stay): no complications     Final Diagnosis: Post-Op Diagnosis Codes:     * Complex tear of medial meniscus, current injury, left knee, initial encounter [S83.232A]    Disposition: Home or Self Care    Follow Up/Patient Instructions:     Medications:  Reconciled Home Medications:   Current Discharge Medication List      CONTINUE these medications which have NOT CHANGED    Details   allopurinol (ZYLOPRIM) 300 MG tablet Take 300 mg by mouth once daily.   Refills: 5      folic acid (FOLVITE) 1 MG tablet Take 1,000 mcg by mouth once daily.  Refills: 4      furosemide (LASIX) 20 MG tablet Take 20 mg by mouth 2 (two) times daily.  Refills: 5      gabapentin (NEURONTIN) 300 MG capsule Take 300 mg by mouth 3 (three) times daily.      glimepiride (AMARYL) 2 MG tablet Take 2 mg by mouth before breakfast.      MAGNESIUM CHLORIDE (SLOW-MAG ORAL) Take 1 tablet by mouth once daily.       meclizine (ANTIVERT) 25 mg tablet Take 25 mg by mouth every  evening.   Refills: 3      metoprolol succinate (TOPROL-XL) 100 MG 24 hr tablet Take 100 mg by mouth once daily.      pantoprazole (PROTONIX) 40 MG tablet Take 40 mg by mouth once daily.      phenazopyridine (PYRIDIUM) 100 MG tablet Take 1 tablet (100 mg total) by mouth 3 (three) times daily with meals.  Qty: 20 tablet, Refills: 0      pramipexole (MIRAPEX) 0.125 MG tablet Take 0.125 mg by mouth nightly as needed.  Refills: 5      ramipril (ALTACE) 2.5 MG capsule Take 2.5 mg by mouth 2 (two) times daily.   Refills: 5      rosuvastatin (CRESTOR) 20 MG tablet Take 20 mg by mouth once daily.      tamsulosin (FLOMAX) 0.4 mg Cp24 Take 1 capsule (0.4 mg total) by mouth once daily.  Qty: 30 capsule, Refills: 5      tramadol (ULTRAM) 50 mg tablet TAKE 1 TABLET BY MOUTH EVERY 6 HOURS AS NEEDED FOR PAIN  Qty: 120 tablet, Refills: 1      aspirin (ECOTRIN) 81 MG EC tablet Take 81 mg by mouth once daily.      fish oil-omega-3 fatty acids 300-1,000 mg capsule Take 2 g by mouth 2 (two) times daily.       potassium citrate 15 mEq TbSR Take 1 tablet by mouth 2 (two) times daily with meals.  Qty: 60 tablet, Refills: 6    Associated Diagnoses: Renal calculi           No discharge procedures on file.

## 2017-05-11 NOTE — PLAN OF CARE
Problem: Patient Care Overview  Goal: Plan of Care Review  Outcome: Outcome(s) achieved Date Met:  05/11/17  Tolerated regular diet, pain tolerable with no intervention at this time, Pt & spouse aware of plan of care. Voiced understanding of d/c instructions and  F/u. No questions or concerns at this time. Ok to d/c per MD order.    Problem: Fall Risk (Adult)  Goal: Absence of Falls  Patient will demonstrate the desired outcomes by discharge/transition of care.   Outcome: Outcome(s) achieved Date Met:  05/11/17  Pt free of falls/incidents. Fall precautions maintained.

## 2017-05-11 NOTE — TRANSFER OF CARE
"Anesthesia Transfer of Care Note    Patient: Daniel Miller    Procedure(s) Performed: Procedure(s) (LRB):  ARTHROSCOPY-KNEE (Left)  MEDIAL MENISCECTOMY (Left)    Patient location: PACU    Anesthesia Type: general    Transport from OR: Transported from OR on room air with adequate spontaneous ventilation    Post pain: adequate analgesia    Post assessment: no apparent anesthetic complications    Post vital signs: stable    Level of consciousness: awake    Nausea/Vomiting: no nausea/vomiting    Complications: none    Transfer of care protocol was followed      Last vitals:   Visit Vitals    /69 (BP Location: Left arm, Patient Position: Sitting, BP Method: Automatic)    Pulse 63    Temp 36.4 °C (97.5 °F) (Oral)    Resp 18    Ht 5' 10" (1.778 m)    Wt 124.1 kg (273 lb 9.5 oz)    SpO2 95%    BMI 39.26 kg/m2     "

## 2017-05-11 NOTE — OP NOTE
DATE OF PROCEDURE:  05/11/2017    PREOPERATIVE DIAGNOSES:  1.  Left knee mild osteoarthritis.  2.  Left knee chronic medial meniscal tear.    POSTOPERATIVE DIAGNOSES:  1.  Left knee mild osteoarthritis.  2.  Left knee chronic medial meniscal tear.    PROCEDURES:  Left knee arthroscopy with partial medial meniscectomy.    SURGEON:  Dorian Be M.D.    ASSISTANT:  None.    ANESTHESIA:  General endotracheal anesthesia was given.    ANTIBIOTICS:  Ancef 2 g given    COMPLICATIONS:  None.    DISPOSITION:  To Recovery Room in stable condition.    COUNTS:  All needles, laps instrument counts were correct.    ESTIMATED BLOOD LOSS:  20 mL    TOURNIQUET TIME:  29 minutes.    INDICATIONS FOR PROCEDURE:  This is a 66-year-old white male who presented for   left knee arthroscopy.  Indications for procedure explained to the patient.    Benefits, risks, and alternatives explained.  All questions were answered.    Consent was obtained.    DESCRIPTION OF PROCEDURE:  After consent was obtained, the patient brought to   the Operating Room, placed in supine position.  General endotracheal anesthesia   was given.  Ancef 2 g given.  Exam of his left knee showed full range of motion   and was stable to exam.  Foot of the bed dropped, tourniquet placed, thigh   john place.  Care was taken to pad all bony prominences.  Left lower extremity   prepped and draped in usual sterile fashion.  Esmarch used to exsanguinate the   left lower extremity.  Tourniquet inflated.  Total tourniquet time was 29   minutes.  Standard anterolateral and anteromedial portals created through a   small stab incision.  Scope placed in the patellofemoral joint.  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  Chondroplasty was done   with a shaver.  Lateral compartment showed grade II chondromalacia.  No meniscal   tears.  ACL and PCL were intact.  Medial compartment showed a complex tear   involving the posterior horn and body of the  medial meniscus involving the   posterior root attachment.  A partial meniscectomy was done.  Remaining meniscus   probed and was found to be intact and stable.  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  Knee was irrigated and suctioned.    All instruments removed.  Skin incision closed with 4-0 Monocryl in a   subcuticular manner.  Mastisol, followed by Steri-Strips followed by a sterile   dressing was applied.  The knee had been injected with about 20 mL of Marcaine   with epinephrine before placing the dressing.  The patient was woken up and   brought to Recovery Room in stable condition.  All needles, laps and instrument   counts were correct.      TESSA  dd: 05/11/2017 09:47:54 (CDT)  td: 05/11/2017 11:13:15 (CDT)  Doc ID   #[id number]  Job ID #[id number]    CC:

## 2017-05-17 VITALS
DIASTOLIC BLOOD PRESSURE: 64 MMHG | RESPIRATION RATE: 18 BRPM | HEIGHT: 70 IN | OXYGEN SATURATION: 98 % | WEIGHT: 273.56 LBS | SYSTOLIC BLOOD PRESSURE: 106 MMHG | HEART RATE: 61 BPM | BODY MASS INDEX: 39.16 KG/M2 | TEMPERATURE: 97 F

## 2017-05-23 ENCOUNTER — HOSPITAL ENCOUNTER (OUTPATIENT)
Dept: RADIOLOGY | Facility: HOSPITAL | Age: 67
Discharge: HOME | End: 2017-05-23
Attending: PAIN MEDICINE
Payer: MEDICARE

## 2017-05-23 DIAGNOSIS — G89.4 CHRONIC PAIN SYNDROME: ICD-10-CM

## 2017-05-23 DIAGNOSIS — M47.26 OSTEOARTHRITIS OF SPINE WITH RADICULOPATHY, LUMBAR REGION: ICD-10-CM

## 2017-05-23 PROCEDURE — 72146 MRI CHEST SPINE W/O DYE: CPT | Mod: 26,,, | Performed by: RADIOLOGY

## 2017-05-23 PROCEDURE — 72146 MRI CHEST SPINE W/O DYE: CPT | Mod: TC

## 2018-03-13 PROBLEM — Z09 FOLLOW-UP EXAM, 3-6 MONTHS SINCE PREVIOUS EXAM: Status: ACTIVE | Noted: 2018-03-13

## 2018-03-13 PROBLEM — Z71.2 ENCOUNTER TO DISCUSS TEST RESULTS: Status: ACTIVE | Noted: 2018-03-13

## 2018-05-08 ENCOUNTER — LAB VISIT (OUTPATIENT)
Dept: LAB | Facility: HOSPITAL | Age: 68
End: 2018-05-08
Attending: INTERNAL MEDICINE
Payer: MEDICARE

## 2018-05-08 DIAGNOSIS — D69.6 THROMBOCYTOPENIA: ICD-10-CM

## 2018-05-08 DIAGNOSIS — R79.89 ABNORMAL LIVER FUNCTION TESTS: Primary | ICD-10-CM

## 2018-05-08 LAB
ALBUMIN SERPL BCP-MCNC: 3.6 G/DL
ALP SERPL-CCNC: 77 U/L
ALT SERPL W/O P-5'-P-CCNC: 19 U/L
AST SERPL-CCNC: 15 U/L
BILIRUB DIRECT SERPL-MCNC: 0.2 MG/DL
BILIRUB SERPL-MCNC: 0.7 MG/DL
INR PPP: 1.1
PROT SERPL-MCNC: 6.8 G/DL
PROTHROMBIN TIME: 10.9 SEC

## 2018-05-08 PROCEDURE — 80076 HEPATIC FUNCTION PANEL: CPT

## 2018-05-08 PROCEDURE — 36415 COLL VENOUS BLD VENIPUNCTURE: CPT

## 2018-05-08 PROCEDURE — 85610 PROTHROMBIN TIME: CPT

## 2018-05-11 ENCOUNTER — TELEPHONE (OUTPATIENT)
Dept: TRANSPLANT | Facility: CLINIC | Age: 68
End: 2018-05-11

## 2018-05-11 NOTE — TELEPHONE ENCOUNTER
----- Message from Alexey Wang sent at 5/11/2018  2:36 PM CDT -----  We have the pt recorders and they are now pending review by the referral nurse.  By:Alexey Wang

## 2018-05-14 ENCOUNTER — DOCUMENTATION ONLY (OUTPATIENT)
Dept: TRANSPLANT | Facility: CLINIC | Age: 68
End: 2018-05-14

## 2018-05-14 NOTE — LETTER
May 14, 2018    Daniel Miller  [address] [address]      Dear Daniel Miller:    Your doctor has referred you to the [facility]. You will be contacted by our office and an initial appointment will then be scheduled for you.    We look forward to seeing you soon. If you have any further questions, please contact us at [phone].       Sincerely,    [facility]   [address] [place] [phone]

## 2018-05-14 NOTE — NURSING
Pt records reviewed.  Pt will be referred to Hepatology due to low plt question cirrhosis.   Initial referral received  from Dr. Jesse Blackburn.  Referral letter sent to provider and patient.

## 2018-05-14 NOTE — LETTER
May 14, 2018    Jesse Blackburn MD  [address] [facility]  [address] [place] [address]      Dear Dr. Blackburn    Patient: Daniel Miller   MR Number: [id number]   YOB: 1950     Thank you for the referral of Daniel Miller to the [facility]. An initial appointment will be scheduled for your patient with one of our Hepatologists.      Thank you again for your trust in our program.  If there is anything we can do for you or your staff, please feel free to contact us.        Sincerely,        [facility]  [address] [place] [id number]  [phone]

## 2018-05-21 ENCOUNTER — TELEPHONE (OUTPATIENT)
Dept: HEPATOLOGY | Facility: CLINIC | Age: 68
End: 2018-05-21

## 2018-05-21 ENCOUNTER — LAB VISIT (OUTPATIENT)
Dept: LAB | Facility: HOSPITAL | Age: 68
End: 2018-05-21
Attending: INTERNAL MEDICINE
Payer: MEDICARE

## 2018-05-21 ENCOUNTER — OFFICE VISIT (OUTPATIENT)
Dept: HEPATOLOGY | Facility: CLINIC | Age: 68
End: 2018-05-21
Payer: MEDICARE

## 2018-05-21 VITALS
HEIGHT: 69 IN | BODY MASS INDEX: 43.27 KG/M2 | OXYGEN SATURATION: 94 % | SYSTOLIC BLOOD PRESSURE: 136 MMHG | RESPIRATION RATE: 18 BRPM | WEIGHT: 292.13 LBS | DIASTOLIC BLOOD PRESSURE: 70 MMHG | TEMPERATURE: 98 F | HEART RATE: 92 BPM

## 2018-05-21 DIAGNOSIS — D69.6 THROMBOCYTOPENIA: ICD-10-CM

## 2018-05-21 DIAGNOSIS — K76.9 CHRONIC LIVER DISEASE: Primary | ICD-10-CM

## 2018-05-21 DIAGNOSIS — K76.9 CHRONIC LIVER DISEASE: ICD-10-CM

## 2018-05-21 LAB
ALBUMIN SERPL BCP-MCNC: 3.7 G/DL
ALP SERPL-CCNC: 91 U/L
ALT SERPL W/O P-5'-P-CCNC: 21 U/L
ANION GAP SERPL CALC-SCNC: 8 MMOL/L
AST SERPL-CCNC: 17 U/L
BILIRUB SERPL-MCNC: 0.9 MG/DL
BUN SERPL-MCNC: 16 MG/DL
CALCIUM SERPL-MCNC: 9.7 MG/DL
CHLORIDE SERPL-SCNC: 104 MMOL/L
CO2 SERPL-SCNC: 30 MMOL/L
CREAT SERPL-MCNC: 1.1 MG/DL
ERYTHROCYTE [DISTWIDTH] IN BLOOD BY AUTOMATED COUNT: 15 %
EST. GFR  (AFRICAN AMERICAN): >60 ML/MIN/1.73 M^2
EST. GFR  (NON AFRICAN AMERICAN): >60 ML/MIN/1.73 M^2
GLUCOSE SERPL-MCNC: 136 MG/DL
HCT VFR BLD AUTO: 42.4 %
HGB BLD-MCNC: 13.8 G/DL
IGG SERPL-MCNC: 990 MG/DL
IGM SERPL-MCNC: 203 MG/DL
INR PPP: 1
MCH RBC QN AUTO: 29.9 PG
MCHC RBC AUTO-ENTMCNC: 32.5 G/DL
MCV RBC AUTO: 92 FL
PLATELET # BLD AUTO: 92 K/UL
PMV BLD AUTO: 11 FL
POTASSIUM SERPL-SCNC: 4 MMOL/L
PROT SERPL-MCNC: 7.3 G/DL
PROTHROMBIN TIME: 10.6 SEC
RBC # BLD AUTO: 4.61 M/UL
SODIUM SERPL-SCNC: 142 MMOL/L
WBC # BLD AUTO: 5.98 K/UL

## 2018-05-21 PROCEDURE — 82784 ASSAY IGA/IGD/IGG/IGM EACH: CPT

## 2018-05-21 PROCEDURE — 86038 ANTINUCLEAR ANTIBODIES: CPT

## 2018-05-21 PROCEDURE — 82787 IGG 1 2 3 OR 4 EACH: CPT

## 2018-05-21 PROCEDURE — 82784 ASSAY IGA/IGD/IGG/IGM EACH: CPT | Mod: 59

## 2018-05-21 PROCEDURE — 99204 OFFICE O/P NEW MOD 45 MIN: CPT | Mod: S$GLB,,, | Performed by: INTERNAL MEDICINE

## 2018-05-21 PROCEDURE — 99999 PR PBB SHADOW E&M-EST. PATIENT-LVL V: CPT | Mod: PBBFAC,,, | Performed by: INTERNAL MEDICINE

## 2018-05-21 PROCEDURE — 85027 COMPLETE CBC AUTOMATED: CPT

## 2018-05-21 PROCEDURE — 80053 COMPREHEN METABOLIC PANEL: CPT

## 2018-05-21 PROCEDURE — 85610 PROTHROMBIN TIME: CPT

## 2018-05-21 PROCEDURE — 86256 FLUORESCENT ANTIBODY TITER: CPT

## 2018-05-21 PROCEDURE — 86235 NUCLEAR ANTIGEN ANTIBODY: CPT | Mod: 91

## 2018-05-21 RX ORDER — OMEGA-3-ACID ETHYL ESTERS 1 G/1
2 CAPSULE, LIQUID FILLED ORAL 2 TIMES DAILY
COMMUNITY
End: 2019-03-11

## 2018-05-21 RX ORDER — ERGOCALCIFEROL 1.25 MG/1
1000 CAPSULE ORAL
COMMUNITY

## 2018-05-21 NOTE — TELEPHONE ENCOUNTER
Patient seen in clinic today 5/21/18 with Dr Ana Schmitz. The patient will need a TJ Liver Biopsy.   Pre and Post Procedure teaching done with the patient and his wife. Written instructions given also. Allowed to verbalize concerns. Questions answered.    The patient will Hold the Eliquis and Lovaza - 5 days before the Liver Biopsy and resume after.  The patient will remain on the low dose Aspirin.  The patient is requesting to do the Biopsy on any Tuesday.  Verbalized understanding and agreed.  Information will be sent to IR.

## 2018-05-21 NOTE — LETTER
May 21, 2018      Jesse Blackburn MD  [address] [facility]  [address] [place] [address]           [facility] - Hepatology  [address]  [place] [address]  Phone: [phone]  Fax: [phone]          Patient: Daniel Miller   MR Number: [id number]   YOB: 1950   Date of Visit: 5/21/2018       Dear Dr. Jesse Blackburn:    Thank you for referring Daniel Miller to me for evaluation. Attached you will find relevant portions of my assessment and plan of care.    If you have questions, please do not hesitate to call me. I look forward to following Daniel Miller along with you.    Sincerely,    Ana Schmitz MD    Enclosure  CC:  No Recipients    If you would like to receive this communication electronically, please contact [email or web address] or [phone] to request more information on [facility] Link access.    For providers and/or their staff who would like to refer a patient to [facility], please contact us through our one-stop-shop provider referral line, [facility], at [phone].    If you feel you have received this communication in error or would no longer like to receive these types of communications, please e-mail [email or web address]

## 2018-05-21 NOTE — PROGRESS NOTES
Hepatology Consult Note    Referring provider: Dr. Jesse Blackburn    Chief complaint:   Chief Complaint   Patient presents with    Cirrhosis       HPI:  Daniel Miller is a 67 y.o. male that presents to [facility] for consultation of had concerns including Cirrhosis..      The patient's risk factors for NAFLD include:    · Obesity/overweight                     yes  · Dyslipidemia                                yes  · Insulin resistance/Diabetes         yes  · Family history of diabetes           no      As regards to liver disease,  - The patient reports no symptoms of hepatitis including malaise or flu-like symptoms to suggest a flare.  - The patient reports no new manifestations of portal hypertension including ascites, edema, GI bleeding, or hepatic encephalopathy to suggest liver decompensation.  - The patient reports no fevers/chills or pruritis to suggest biliary disease.    Takes Eliquis for chronic atrial fibrillation.   Used to take MTX for 5-6 years for RA, stopped it now.    PMH: 2 x CVAs, RA, DM2, neuropathy.    Alcohol: stopped 10-15 years ago, but prior to that he was a heavy drinker.      Patient Active Problem List   Diagnosis    Rheumatoid arthritis    Rheumatoid arthritis(714.0)    Thrombocytopenia    CKD (chronic kidney disease)    Long-term use of immunosuppressant medication    BPH (benign prostatic hyperplasia)    Renal stones    Gross hematuria    Malignant neoplasm of skin of right upper extremity    Actinic keratosis    Cancer of skin of right upper extremity    Complex tear of medial meniscus, current injury, left knee, initial encounter    Follow-up exam, 3-6 months since previous exam    Encounter to discuss test results       Past Medical History:   Diagnosis Date    Actinic keratosis 9/2/2016    Acute ITP 2/6/2008    Arthritis     Cancer of skin of right upper extremity 9/2/2016    Degenerative disc disease     Diabetes mellitus     Gout      Hypertension     Kidney stone     Malignant neoplasm of skin of right upper extremity 10/24/2016    Malignant neoplasm of skin of right upper extremity 10/24/2016    Nephrolithiasis     Rheumatoid arthritis     RLS (restless legs syndrome)     Stroke 2003; 2005 x 2     2 or 3 times (affected his speech and weak all over); ear doctor said vertigo in 5-2015 may have had a mild stroke    Thrombocytopenia     Thyroid disease     Vertigo 5/2015    Ear doctor said it may have been a mild stroke       Past Surgical History:   Procedure Laterality Date    APPENDECTOMY  1990    CAROTID ENDARTERECTOMY Right 2004    CHOLECYSTECTOMY  2011    EXTRACORPOREAL SHOCK WAVE LITHOTRIPSY  2000    EYE SURGERY Bilateral 2012    Cataract surgery with lens implant    HERNIA REPAIR  2011    Umbilical hernia    ROTATOR CUFF REPAIR Right 2008       Family History   Problem Relation Age of Onset    Cancer Mother     Cancer Father     Cancer Brother     Gout Brother        Social History     Social History    Marital status:      Spouse name: N/A    Number of children: N/A    Years of education: N/A     Social History Main Topics    Smoking status: Former Smoker     Types: Cigarettes     Start date: 6/22/1963     Quit date: 6/22/2004    Smokeless tobacco: Never Used    Alcohol use No    Drug use: No    Sexual activity: Yes     Partners: Female      Comment:      Other Topics Concern    None     Social History Narrative    None       Current Outpatient Prescriptions   Medication Sig Dispense Refill    allopurinol (ZYLOPRIM) 300 MG tablet Take 300 mg by mouth once daily.   5    aspirin (ECOTRIN) 81 MG EC tablet Take 81 mg by mouth once daily.      buPROPion (WELLBUTRIN XL) 300 MG 24 hr tablet TAKE 1 TABLET(S) EVERY DAY BY ORAL ROUTE.  1    ELIQUIS 2.5 mg Tab       ergocalciferol (VITAMIN D2) 50,000 unit Cap Take 1,000 Units by mouth every 7 days.      escitalopram oxalate (LEXAPRO) 20 MG  tablet       fish oil-omega-3 fatty acids 300-1,000 mg capsule Take 2 g by mouth 2 (two) times daily.       folic acid (FOLVITE) 1 MG tablet Take 1,000 mcg by mouth once daily.  4    furosemide (LASIX) 20 MG tablet Take 20 mg by mouth 2 (two) times daily.  5    gabapentin (NEURONTIN) 300 MG capsule Take 300 mg by mouth 3 (three) times daily.      glimepiride (AMARYL) 2 MG tablet Take 2 mg by mouth before breakfast.      magnesium oxide (MAG-OX) 400 mg tablet Take 1 tablet by mouth 2 (two) times daily.  1    meclizine (ANTIVERT) 25 mg tablet Take 25 mg by mouth every evening.   3    metoprolol tartrate (LOPRESSOR) 25 MG tablet Take 25 mg by mouth 2 (two) times daily.      naltrexone (DEPADE) 50 mg tablet       omega-3 acid ethyl esters (LOVAZA) 1 gram capsule Take 2 g by mouth 2 (two) times daily.      pantoprazole (PROTONIX) 40 MG tablet Take 40 mg by mouth once daily.      pramipexole (MIRAPEX) 0.125 MG tablet Take 0.125 mg by mouth nightly as needed.  5    rosuvastatin (CRESTOR) 20 MG tablet Take 20 mg by mouth once daily.      SIMETHICONE ORAL Take 160 mg by mouth once daily.      tamsulosin (FLOMAX) 0.4 mg Cp24 Take 1 capsule (0.4 mg total) by mouth once daily. 30 capsule 5    tramadol (ULTRAM) 50 mg tablet TAKE 1 TABLET BY MOUTH EVERY 6 HOURS AS NEEDED FOR PAIN 120 tablet 1     No current facility-administered medications for this visit.        Review of patient's allergies indicates:   Allergen Reactions    Lipitor [atorvastatin] Hives       Review of Systems   Constitutional: Positive for malaise/fatigue. Negative for chills, fever and weight loss.   HENT: Negative for congestion, nosebleeds and sore throat.    Eyes: Negative for blurred vision, double vision and photophobia.   Respiratory: Negative for cough and shortness of breath.    Cardiovascular: Negative for chest pain, palpitations, orthopnea and leg swelling.   Gastrointestinal: Negative for abdominal pain, blood in stool,  "constipation, diarrhea, melena and vomiting.   Genitourinary: Negative for dysuria.   Musculoskeletal: Positive for back pain and joint pain. Negative for falls.   Skin: Negative for itching and rash.   Neurological: Negative for dizziness, tremors and weakness.   Endo/Heme/Allergies: Does not bruise/bleed easily.   Psychiatric/Behavioral: Negative for depression and substance abuse. The patient is not nervous/anxious and does not have insomnia.        Vitals:    05/21/18 0843   BP: 136/70   Pulse: 92   Resp: 18   Temp: 98.1 °F (36.7 °C)   TempSrc: Oral   SpO2: (!) 94%   Weight: 132.5 kg (292 lb 1.8 oz)   Height: 5' 9" (1.753 m)       Physical Exam   Constitutional: He is oriented to person, place, and time. He appears well-developed and well-nourished. No distress.   obese   HENT:   Head: Normocephalic and atraumatic.   Mouth/Throat: Oropharynx is clear and moist.   Eyes: Conjunctivae are normal. No scleral icterus.   Neck: Normal range of motion.   Cardiovascular: Normal rate, regular rhythm, normal heart sounds and intact distal pulses.    Pulmonary/Chest: Effort normal and breath sounds normal. No respiratory distress. He has no wheezes. He has no rales.   Abdominal: Soft. Normal appearance and bowel sounds are normal. He exhibits no shifting dullness, no distension, no pulsatile liver, no fluid wave and no ascites. There is no splenomegaly or hepatomegaly. No hernia.   Musculoskeletal: He exhibits no edema.   Neurological: He is alert and oriented to person, place, and time. He is not disoriented.   Skin: Skin is warm and dry. No rash noted. He is not diaphoretic.   Psychiatric: He has a normal mood and affect. His behavior is normal. His mood appears not anxious. His affect is not inappropriate. He is not agitated. He is communicative. He is attentive.   Nursing note and vitals reviewed.      LABS: I personally reviewed pertinent laboratory findings.    Lab Results   Component Value Date    ALT 19 05/08/2018 "    AST 15 05/08/2018    ALKPHOS 77 05/08/2018    BILITOT 0.7 05/08/2018       Lab Results   Component Value Date    WBC 5.50 03/05/2018    HGB 13.7 (L) 03/05/2018    HCT 41.2 03/05/2018    MCV 91 03/05/2018    PLT 96 (L) 03/05/2018       Lab Results   Component Value Date     03/05/2018    K 4.1 03/05/2018     03/05/2018    CO2 29 03/05/2018    BUN 18 03/05/2018    CREATININE 1.10 03/05/2018    CALCIUM 9.3 03/05/2018    ANIONGAP 11 06/23/2015    ESTGFRAFRICA >60 03/05/2018    EGFRNONAA >60 03/05/2018       Lab Results   Component Value Date    INR 1.1 05/08/2018       No results found for: SMOOTHMUSCAB, MITOAB  No results found for: IRON, TIBC, FERRITIN, SATURATEDIRO  Lab Results   Component Value Date    HEPBCAB Negative 09/15/2014    HEPCAB Negative 09/15/2014     No results found for: TSH  No results found for: VANDA    No results found for: ABORH        No results found for: LABA1C, HGBA1C  No results found for: CHOL  No results found for: HDL  No results found for: LDLCALC  No results found for: TRIG  No results found for: CHOLHDL        Imaging:   I personally reviewed recent cardiology studies available on the chart and from outside medical records.    I personally reviewed recent imaging studies available on the chart and from outside medical records.        Assessment:  67 y.o. male presenting with     1. Chronic liver disease    2. Thrombocytopenia      Likely has high grade fibrosi or cirrhosis secondary to NAFLD/LONGO. He also has exposure to methotrexate for a few years. He has no symptoms of liver disease complications.  Will check autoimmune markers and biopsy to r/o AIH as he has of RA.      Recommendation(s):  - recommend transjugular liver biopsy and blood tests  -will order serologies/autoimmune markers/iron/ceruloplasmin to r/o other possible causes of chronic liver disease for the sake of thoroughness in work-up  - return 1-2 weeks after liver biopsy    - life-style modifications:  weight loss, daily exercise (30 mins of HIIT or cardio), low carb/low fat diet  - control of high cholesterol, if needed with statin drugs or other cholesterol-lowering agents  - vitamin E supplements (no more than 800 mg per day) may help reduce liver fat  - coffee consumption (low caloric): 2-3 cups per day may reduce liver fat  - avoid alcohol consumption  A total of 45 minutes were spent face-to-face with the patient during this encounter and over half of that time was spent on counseling and coordination of care.  We discussed in depth the nature of the patient's liver disease, the management plan in details. I also educated the patient about lifestyle modifications which may improve hepatic steatosis, overweight/obesity, insulin resistance and high blood pressure issues. I have provided the patient with an opportunity to ask questions and have all questions answered to his satisfaction.       I have sent communication to the referring physician and/or primary care provider.      Ana Schmitz MD  Staff Physician  Hepatology and Liver Transplant  [facility]  [facility] [place]

## 2018-05-21 NOTE — PATIENT INSTRUCTIONS
- recommend transjugular liver biopsy and blood tests  - return 1-2 weeks after liver biopsy    - life-style modifications: weight loss, daily exercise (30 mins of HIIT or cardio), low carb/low fat diet  - control of high cholesterol, if needed with statin drugs or other cholesterol-lowering agents  - vitamin E supplements (no more than 800 mg per day) may help reduce liver fat  - coffee consumption (low caloric): 2-3 cups per day may reduce liver fat  - avoid alcohol consumption      Nonalcoholic Fatty Liver Disease (NAFLD)  Nonalcoholic fatty liver disease (NAFLD) is a common disease of the liver. It occurs when you have too much fat in the liver. If NAFLD is severe, it can cause liver damage that seems like the damage caused by drinking too much alcohol. But NAFLD is not caused by drinking alcohol. This sheet tells you more about NAFLD and how it can be managed.    How the liver works   The liver is an organ in the upper right side of the belly (abdomen). It has many important jobs. These include:  · Breaking down (metabolizing) proteins, carbohydrates, and fats  · Making a substance called bile that helps break down fats  · Storing and releasing sugar (glucose) into the blood to give the body energy  · Removing toxins from the blood  · Helping with blood clotting  Understanding NAFLD  A healthy liver may contain some fat. But if too much fat builds up in the liver, this causes NAFLD. NAFLD can be mild, causing fatty liver. Or it can be more severe and show inflammation, as well as the fat. This can cause non-alcoholic steatohepatitis (LONGO).  · Fatty liver. With fatty liver, the liver simply has more fat than normal. This extra fat usually does not harm the liver.  · LONGO. With LONGO, the fatty liver becomes inflamed over time. LONGO is serious because it can lead to scarring of the liver (fibrosis). Over time, the scarring may lead to cirrhosis of the liver. This can eventually cause liver failure or liver  cancer.  Causes and risk factors of NAFLD  Doctors don't know what causes NAFLD. But certain things make the problem more likely to happen. These include:  · Obesity  · Prediabetes or diabetes  · High levels of fat found in the blood (cholesterol and triglycerides)  · Being exposed to certain medicines   Symptoms of NAFLD  Most people with NAFLD have no symptoms. If symptoms do occur, they can include:  · Tiredness  · Weakness  · Weight loss  · Loss of appetite  · Nausea and vomiting  · Belly pain and cramping  · Yellowing of the skin and eyes (jaundice), as well as dark urine, or light-colored stools  · Swelling in the belly or legs  Diagnosing NAFLD  Your healthcare provider may think you have NAFLD if routine blood tests show high levels of liver enzymes. This may mean you have a liver problem. You may need one or more imaging tests, such as an ultrasound, CT, or MRI. You may need more blood tests to look for other causes of liver disease. You may also need a liver biopsy. During this test, a hollow needle is used to remove a tiny tissue sample from your liver. This tissue is then checked in a lab. This test can find signs of damage to liver tissue. It can also help figure out the cause of the damage and tell the difference between fatty liver and LONGO.  Treating NAFLD  Treatment for NAFLD varies for each person. The best early treatment is to treat any underlying conditions causing metabolic syndrome. This is the name for a group of conditions that includes:  · High blood pressure  · High levels of cholesterol and triglycerides  · Being overweight or obese  · Diabetes  Your healthcare provider will monitor your health and treat any symptoms or underlying health problems you have. Your provider will also work with you to control your risk factors. This will make liver damage less likely. In fact, treating those underlying conditions can often improve liver disease. You may need to take certain medicines, but no  medicine will cure NAFLD. This is why treating the underlying conditions is most important. Your plan may include:  · Losing extra weight  · Getting regular exercise  · Controlling diabetes and high cholesterol or triglyceride levels  · Taking medicines and vitamins as prescribed by your provider  · Quitting smoking  · Not drinking alcohol  · Eating a healthy and balanced diet  Living with NAFLD  If NAFLD is caught early, it can be managed with treatment. Your healthcare provider will discuss further treatment choices with you as needed.  Be sure to ask your provider about recommended vaccines. These include vaccines for viruses that can cause liver disease.  Date Last Reviewed: 12/1/2016  © 3694-4757 [employer]. [address] [place] [address], [place] [address]. All rights reserved. This information is not intended as a substitute for professional medical care. Always follow your healthcare professional's instructions.

## 2018-05-22 ENCOUNTER — TELEPHONE (OUTPATIENT)
Dept: HEPATOLOGY | Facility: CLINIC | Age: 68
End: 2018-05-22

## 2018-05-22 LAB
ANA SER QL IF: NORMAL
MITOCHONDRIA AB TITR SER IF: NORMAL {TITER}
SMOOTH MUSCLE AB TITR SER IF: NORMAL {TITER}

## 2018-05-23 LAB — IGG4 SER-MCNC: 24 MG/DL

## 2018-05-25 DIAGNOSIS — K76.9 CHRONIC LIVER DISEASE: Primary | ICD-10-CM

## 2018-05-29 ENCOUNTER — SURGERY (OUTPATIENT)
Age: 68
End: 2018-05-29

## 2018-05-29 ENCOUNTER — HOSPITAL ENCOUNTER (OUTPATIENT)
Facility: HOSPITAL | Age: 68
Discharge: HOME | End: 2018-05-29
Attending: RADIOLOGY | Admitting: RADIOLOGY
Payer: MEDICARE

## 2018-05-29 VITALS
HEART RATE: 73 BPM | DIASTOLIC BLOOD PRESSURE: 73 MMHG | BODY MASS INDEX: 40.88 KG/M2 | TEMPERATURE: 98 F | OXYGEN SATURATION: 98 % | RESPIRATION RATE: 17 BRPM | SYSTOLIC BLOOD PRESSURE: 126 MMHG | HEIGHT: 71 IN | WEIGHT: 292 LBS

## 2018-05-29 DIAGNOSIS — K76.9 CHRONIC LIVER DISEASE: ICD-10-CM

## 2018-05-29 LAB — POCT GLUCOSE: 146 MG/DL (ref 70–110)

## 2018-05-29 PROCEDURE — 63600175 PHARM REV CODE 636 W HCPCS: Performed by: RADIOLOGY

## 2018-05-29 PROCEDURE — 88307 TISSUE EXAM BY PATHOLOGIST: CPT | Mod: 26,,, | Performed by: PATHOLOGY

## 2018-05-29 PROCEDURE — 82962 GLUCOSE BLOOD TEST: CPT

## 2018-05-29 PROCEDURE — 25000003 PHARM REV CODE 250: Performed by: FAMILY MEDICINE

## 2018-05-29 PROCEDURE — 88313 SPECIAL STAINS GROUP 2: CPT | Mod: 26,,, | Performed by: PATHOLOGY

## 2018-05-29 PROCEDURE — 88307 TISSUE EXAM BY PATHOLOGIST: CPT | Performed by: PATHOLOGY

## 2018-05-29 PROCEDURE — 25500020 PHARM REV CODE 255: Performed by: RADIOLOGY

## 2018-05-29 RX ORDER — HEPARIN SODIUM 200 [USP'U]/100ML
500 INJECTION, SOLUTION INTRAVENOUS CONTINUOUS
Status: DISCONTINUED | OUTPATIENT
Start: 2018-05-29 | End: 2018-05-29 | Stop reason: HOSPADM

## 2018-05-29 RX ORDER — MIDAZOLAM HYDROCHLORIDE 1 MG/ML
INJECTION INTRAMUSCULAR; INTRAVENOUS CODE/TRAUMA/SEDATION MEDICATION
Status: COMPLETED | OUTPATIENT
Start: 2018-05-29 | End: 2018-05-29

## 2018-05-29 RX ORDER — FENTANYL CITRATE 50 UG/ML
50 INJECTION, SOLUTION INTRAMUSCULAR; INTRAVENOUS
Status: DISCONTINUED | OUTPATIENT
Start: 2018-05-29 | End: 2018-05-29 | Stop reason: HOSPADM

## 2018-05-29 RX ORDER — MIDAZOLAM HYDROCHLORIDE 1 MG/ML
1 INJECTION INTRAMUSCULAR; INTRAVENOUS
Status: DISCONTINUED | OUTPATIENT
Start: 2018-05-29 | End: 2018-05-29 | Stop reason: HOSPADM

## 2018-05-29 RX ORDER — FENTANYL CITRATE 50 UG/ML
INJECTION, SOLUTION INTRAMUSCULAR; INTRAVENOUS CODE/TRAUMA/SEDATION MEDICATION
Status: COMPLETED | OUTPATIENT
Start: 2018-05-29 | End: 2018-05-29

## 2018-05-29 RX ORDER — SODIUM CHLORIDE 9 MG/ML
500 INJECTION, SOLUTION INTRAVENOUS ONCE
Status: COMPLETED | OUTPATIENT
Start: 2018-05-29 | End: 2018-05-29

## 2018-05-29 RX ADMIN — FENTANYL CITRATE 50 MCG: 50 INJECTION, SOLUTION INTRAMUSCULAR; INTRAVENOUS at 02:05

## 2018-05-29 RX ADMIN — IOHEXOL 20 ML: 300 INJECTION, SOLUTION INTRAVENOUS at 03:05

## 2018-05-29 RX ADMIN — SODIUM CHLORIDE 500 ML: 0.9 INJECTION, SOLUTION INTRAVENOUS at 01:05

## 2018-05-29 RX ADMIN — MIDAZOLAM HYDROCHLORIDE 1 MG: 1 INJECTION, SOLUTION INTRAMUSCULAR; INTRAVENOUS at 02:05

## 2018-05-29 RX ADMIN — MIDAZOLAM HYDROCHLORIDE 1 MG: 1 INJECTION, SOLUTION INTRAMUSCULAR; INTRAVENOUS at 03:05

## 2018-05-29 RX ADMIN — FENTANYL CITRATE 50 MCG: 50 INJECTION, SOLUTION INTRAMUSCULAR; INTRAVENOUS at 03:05

## 2018-05-29 NOTE — PROGRESS NOTES
Patient is ready for discharge.  PIV removed, catheter intact, no redness or swelling noted at the site.  Discharge instructions and AVS reviewed with patient and patient's wife, both verbalized understanding.  Patient waiting for patient escort.

## 2018-05-29 NOTE — DISCHARGE INSTRUCTIONS
Please call with any questions or concerns.      Monday thru Friday 8:00 am - 4:30 pm    Interventional Radiology   [phone]    After Hours    Ask for the Radiology Intern on call  [phone]

## 2018-05-29 NOTE — DISCHARGE SUMMARY
Radiology Discharge Summary      Hospital Course: No complications    Admit Date: 5/29/2018  Discharge Date: 05/29/2018     Instructions Given to Patient: Yes  Diet: Resume prior diet  Activity: activity as tolerated    Description of Condition on Discharge: Stable  Vital Signs (Most Recent): Temp: 98.6 °F (37 °C) (05/29/18 1253)  Pulse: 73 (05/29/18 1515)  Resp: 12 (05/29/18 1515)  BP: 132/80 (05/29/18 1515)  SpO2: 97 % (05/29/18 1515)    Discharge Disposition: Home    Discharge Diagnosis: cirrhosis     Follow-up: per ordering provider     Gus Houston MD  Department of Radiology  Pager: [phone]

## 2018-05-29 NOTE — PROCEDURES
Radiology Post-Procedure Note    Pre Op Diagnosis: cirrhosis    Post Op Diagnosis: same    Procedure: Transjugular liver biposy    Procedure performed by: David Velázquez MD; Gus Houston MD (res)    Written Informed Consent Obtained: Yes    Specimen Removed: YES 4 19g core biopsy specimens    Estimated Blood Loss: Minimal    Findings: Local anesthesia and moderate sedation were used.    A right-sided transjugular approach was used to performed hepatic venography, pressure measurements and liver biopsy.  Refer to imaging report for pressure measurements. 4 random specimens of the right hepatic lobe were obtained and sent to pathology for further evaluation.    Hemostasis of the right internal jugular vein was achieved using manual pressure and there was no hematoma.    The patient tolerated the procedure well and there were no complications.  Please see Imaging report for further details.    Gus Houston MD  Department of Radiology  Pager: [phone]

## 2018-05-29 NOTE — PLAN OF CARE
Pt pre op completed. Wife will take his belongings. IR phone made aware pt was ready for procedure. Pt states back pain is tolerable, no nausea noted. Will continue to monitor.

## 2018-05-29 NOTE — PROGRESS NOTES
Patient arrived in [facility] bay 2 via stretcher post procedure. Report received from MARCIAL Villarreal. Patient denies pain or discomfort. Dressing to right neck is clean, dry and intact.

## 2018-06-14 ENCOUNTER — HOSPITAL ENCOUNTER (OUTPATIENT)
Dept: RADIOLOGY | Facility: HOSPITAL | Age: 68
Discharge: HOME | End: 2018-06-14
Attending: ORTHOPAEDIC SURGERY
Payer: MEDICARE

## 2018-06-14 DIAGNOSIS — M25.512 LEFT SHOULDER PAIN: ICD-10-CM

## 2018-06-14 PROCEDURE — 73030 X-RAY EXAM OF SHOULDER: CPT | Mod: TC,LT

## 2018-06-18 PROBLEM — Z09 FOLLOW-UP EXAM, 3-6 MONTHS SINCE PREVIOUS EXAM: Status: RESOLVED | Noted: 2018-03-13 | Resolved: 2018-06-18

## 2018-06-20 ENCOUNTER — OFFICE VISIT (OUTPATIENT)
Dept: HEPATOLOGY | Facility: CLINIC | Age: 68
End: 2018-06-20
Payer: MEDICARE

## 2018-06-20 VITALS
TEMPERATURE: 97 F | WEIGHT: 293.19 LBS | SYSTOLIC BLOOD PRESSURE: 149 MMHG | HEIGHT: 70 IN | HEART RATE: 81 BPM | RESPIRATION RATE: 18 BRPM | BODY MASS INDEX: 41.97 KG/M2 | OXYGEN SATURATION: 91 % | DIASTOLIC BLOOD PRESSURE: 68 MMHG

## 2018-06-20 DIAGNOSIS — K76.0 FATTY LIVER DISEASE, NONALCOHOLIC: Primary | ICD-10-CM

## 2018-06-20 DIAGNOSIS — K75.81 NASH (NONALCOHOLIC STEATOHEPATITIS): ICD-10-CM

## 2018-06-20 PROCEDURE — 99499 UNLISTED E&M SERVICE: CPT | Mod: S$GLB,,, | Performed by: INTERNAL MEDICINE

## 2018-06-20 PROCEDURE — 99999 PR PBB SHADOW E&M-EST. PATIENT-LVL V: CPT | Mod: PBBFAC,,, | Performed by: INTERNAL MEDICINE

## 2018-06-20 PROCEDURE — 99214 OFFICE O/P EST MOD 30 MIN: CPT | Mod: S$GLB,,, | Performed by: INTERNAL MEDICINE

## 2018-06-20 NOTE — PATIENT INSTRUCTIONS
Your liver biopsy showed you have non-alcoholic fatty liver disease and fatty inflammation. There was a low grade scarring (stage 2 out of 4) in your liver.     - life-style modifications: weight loss, daily exercise (30 mins of HIIT or cardio), low carb/low fat diet  - control of diabetes or insulin resistance   - control of high cholesterol, if needed with statin drugs or other cholesterol-lowering agents  - vitamin E supplements (no more than 800 mg per day) may help reduce liver fat  - coffee consumption (low caloric): 2-3 cups per day may reduce liver fat  - return in 9 months

## 2018-06-20 NOTE — PROGRESS NOTES
Patient, Daniel Miller (MRN #[id number]), presented with a recorded BMI of 42.07 kg/m^2 consistent with the definition of morbid obesity (ICD-10 E66.01). The patient's morbid obesity was monitored, evaluated, addressed and/or treated. This addendum to the medical record is made on 06/20/2018.

## 2018-06-20 NOTE — LETTER
June 20, 2018        Jesse Blackburn MD  [address] [facility]  [address] [place] [address]             [facility] - Hepatology  [address]  [place] [address]  Phone: [phone]  Fax: [phone]   Patient: Daniel Miller   MR Number: [id number]   YOB: 1950   Date of Visit: 6/20/2018       Dear Dr. Blackburn:    Thank you for referring Daniel Miller to me for evaluation. Attached you will find relevant portions of my assessment and plan of care.    Mr. Miller has LONGO and stage 1-2 fibrosis.   He will need to continue statin drugs. I have counseled him of life-style modifications for weight loss.    If you have questions, please do not hesitate to call me. I look forward to following Daniel Miller along with you.    Sincerely,      Ana Schmitz MD            CC  No Recipients    Enclosure

## 2018-06-20 NOTE — LETTER
June 20, 2018        John López MD  [address] [facility]  [place] [address]             [facility] - Hepatology  [address]  [place] [address]  Phone: [phone]  Fax: [phone]   Patient: Daniel Miller   MR Number: [id number]   YOB: 1950   Date of Visit: 6/20/2018       Dear Dr. López:    Thank you for referring Daniel Miller to me for evaluation. Attached you will find relevant portions of my assessment and plan of care.    Mr. Miller has LONGO and stage 1-2 fibrosis.   He will need to continue statin drugs. I have counseled him of life-style modifications for weight loss.    If you have questions, please do not hesitate to call me. I look forward to following Daniel Miller along with you.    Sincerely,      Ana Schmitz MD            CC  Jesse Blackburn MD    Enclosure

## 2018-06-20 NOTE — PROGRESS NOTES
Hepatology Consult Note    Referring provider: Dr. Jesse Blackburn    Chief complaint:   Chief Complaint   Patient presents with    Chronic liver disease       HPI:  Daniel Miller is a 67 y.o. male that presents to [facility] for consultation of had concerns including Chronic liver disease..      Follow up  18: No complaints today except his shoulder, will have MRI at OSH. Informed him of HVP - no portal hypertension, LNOGO w/ stage 1-2 fibrosis. Again, counseled for life-style modifications.    18: TJ liver biopsy: The corrected sinusoidal pressure (wedged hepatic vein pressure minus free hepatic vein pressure) is 5 mmHg.    LIVER, RANDOM (TRANSJUGULAR BIOPSY):  Steatohepatitis with mild steatosis, 10%  Portal fibrosis with septae (stage 1/2 of 4)  No hepatocyte iron  Iron and trichrome stains with appropriate controls  Anya hyalin identified. NAFLD activity score  (WINIFRED): Steatosis 1, lobular inflammation 1, ballooning hepatocytes 2, overall score 4.    The patient's risk factors for NAFLD include:    · Obesity/overweight                     yes  · Dyslipidemia                                yes  · Insulin resistance/Diabetes         yes  · Family history of diabetes           no      As regards to liver disease,  - The patient reports no symptoms of hepatitis including malaise or flu-like symptoms to suggest a flare.  - The patient reports no new manifestations of portal hypertension including ascites, edema, GI bleeding, or hepatic encephalopathy to suggest liver decompensation.  - The patient reports no fevers/chills or pruritis to suggest biliary disease.    Takes Eliquis for chronic atrial fibrillation.   Used to take MTX for 5-6 years for RA, stopped it now.    PMH: 2 x CVAs, RA, DM2, neuropathy.    Alcohol: stopped 10-15 years ago, but prior to that he was a heavy drinker.      Patient Active Problem List   Diagnosis    Rheumatoid arthritis    Rheumatoid  arthritis(714.0)    Thrombocytopenia    CKD (chronic kidney disease)    Long-term use of immunosuppressant medication    BPH (benign prostatic hyperplasia)    Renal stones    Gross hematuria    Malignant neoplasm of skin of right upper extremity    Actinic keratosis    Cancer of skin of right upper extremity    Complex tear of medial meniscus, current injury, left knee, initial encounter    Encounter to discuss test results    Chronic liver disease       Past Medical History:   Diagnosis Date    Actinic keratosis 9/2/2016    Acute ITP 2/6/2008    Arthritis     Cancer of skin of right upper extremity 9/2/2016    Degenerative disc disease     Diabetes mellitus     Gout     Hypertension     Kidney stone     Malignant neoplasm of skin of right upper extremity 10/24/2016    Malignant neoplasm of skin of right upper extremity 10/24/2016    Nephrolithiasis     Rheumatoid arthritis     RLS (restless legs syndrome)     Stroke 2003; 2005 x 2     2 or 3 times (affected his speech and weak all over); ear doctor said vertigo in 5-2015 may have had a mild stroke    Thrombocytopenia     Thyroid disease     Vertigo 5/2015    Ear doctor said it may have been a mild stroke       Past Surgical History:   Procedure Laterality Date    APPENDECTOMY  1990    CAROTID ENDARTERECTOMY Right 2004    CHOLECYSTECTOMY  2011    EXTRACORPOREAL SHOCK WAVE LITHOTRIPSY  2000    EYE SURGERY Bilateral 2012    Cataract surgery with lens implant    HERNIA REPAIR  2011    Umbilical hernia    ROTATOR CUFF REPAIR Right 2008    TRANSJUGULAR BIOPSY OF LIVER N/A 5/29/2018    Procedure: Transjugular liver biopsy;  Surgeon: Grady Diagnostic Provider;  Location: [facility] OR [address] [place] [address];  Service: Radiology;  Laterality: N/A;       Family History   Problem Relation Age of Onset    Cancer Mother     Cancer Father     Cancer Brother     Gout Brother        Social History     Social History    Marital status:      Spouse name:  N/A    Number of children: N/A    Years of education: N/A     Social History Main Topics    Smoking status: Former Smoker     Types: Cigarettes     Start date: 6/22/1963     Quit date: 6/22/2004    Smokeless tobacco: Never Used    Alcohol use No    Drug use: No    Sexual activity: Yes     Partners: Female      Comment:      Other Topics Concern    Not on file     Social History Narrative    No narrative on file       Current Outpatient Prescriptions   Medication Sig Dispense Refill    allopurinol (ZYLOPRIM) 300 MG tablet Take 300 mg by mouth once daily.   5    aspirin (ECOTRIN) 81 MG EC tablet Take 81 mg by mouth once daily.      buPROPion (WELLBUTRIN XL) 300 MG 24 hr tablet TAKE 1 TABLET(S) EVERY DAY BY ORAL ROUTE.  1    ELIQUIS 2.5 mg Tab       ergocalciferol (VITAMIN D2) 50,000 unit Cap Take 1,000 Units by mouth every 7 days.      escitalopram oxalate (LEXAPRO) 20 MG tablet       fish oil-omega-3 fatty acids 300-1,000 mg capsule Take 2 g by mouth 2 (two) times daily.       folic acid (FOLVITE) 1 MG tablet Take 1,000 mcg by mouth once daily.  4    furosemide (LASIX) 20 MG tablet Take 20 mg by mouth 2 (two) times daily.  5    gabapentin (NEURONTIN) 300 MG capsule Take 300 mg by mouth 3 (three) times daily.      glimepiride (AMARYL) 2 MG tablet Take 2 mg by mouth before breakfast.      magnesium oxide (MAG-OX) 400 mg tablet Take 1 tablet by mouth 2 (two) times daily.  1    meclizine (ANTIVERT) 25 mg tablet Take 25 mg by mouth every evening.   3    metoprolol tartrate (LOPRESSOR) 25 MG tablet Take 25 mg by mouth 2 (two) times daily.      naltrexone (DEPADE) 50 mg tablet       omega-3 acid ethyl esters (LOVAZA) 1 gram capsule Take 2 g by mouth 2 (two) times daily.      pantoprazole (PROTONIX) 40 MG tablet Take 40 mg by mouth once daily.      pramipexole (MIRAPEX) 0.125 MG tablet Take 0.125 mg by mouth nightly as needed.  5    rosuvastatin (CRESTOR) 20 MG tablet Take 20 mg by mouth  once daily.      SIMETHICONE ORAL Take 160 mg by mouth once daily.      tamsulosin (FLOMAX) 0.4 mg Cp24 Take 1 capsule (0.4 mg total) by mouth once daily. 30 capsule 5    tramadol (ULTRAM) 50 mg tablet TAKE 1 TABLET BY MOUTH EVERY 6 HOURS AS NEEDED FOR PAIN 120 tablet 1     No current facility-administered medications for this visit.        Review of patient's allergies indicates:   Allergen Reactions    Lipitor [atorvastatin] Hives       Review of Systems   Constitutional: Positive for malaise/fatigue. Negative for chills, fever and weight loss.   HENT: Negative for congestion, nosebleeds and sore throat.    Eyes: Negative for blurred vision, double vision and photophobia.   Respiratory: Negative for cough and shortness of breath.    Cardiovascular: Negative for chest pain, palpitations, orthopnea and leg swelling.   Gastrointestinal: Negative for abdominal pain, blood in stool, constipation, diarrhea, melena and vomiting.   Genitourinary: Negative for dysuria.   Musculoskeletal: Positive for back pain and joint pain. Negative for falls.   Skin: Negative for itching and rash.   Neurological: Negative for dizziness, tremors and weakness.   Endo/Heme/Allergies: Does not bruise/bleed easily.   Psychiatric/Behavioral: Negative for depression and substance abuse. The patient is not nervous/anxious and does not have insomnia.        There were no vitals filed for this visit.    Physical Exam   Constitutional: He is oriented to person, place, and time. He appears well-developed and well-nourished. No distress.   obese   HENT:   Head: Normocephalic and atraumatic.   Mouth/Throat: Oropharynx is clear and moist.   Eyes: Conjunctivae are normal. No scleral icterus.   Neck: Normal range of motion.   Cardiovascular: Normal rate, regular rhythm, normal heart sounds and intact distal pulses.    Pulmonary/Chest: Effort normal and breath sounds normal. No respiratory distress. He has no wheezes. He has no rales.   Abdominal:  Soft. Normal appearance and bowel sounds are normal. He exhibits no shifting dullness, no distension, no pulsatile liver, no fluid wave and no ascites. There is no splenomegaly or hepatomegaly. No hernia.   Musculoskeletal: He exhibits no edema.   Neurological: He is alert and oriented to person, place, and time. He is not disoriented.   Skin: Skin is warm and dry. No rash noted. He is not diaphoretic.   Psychiatric: He has a normal mood and affect. His behavior is normal. His mood appears not anxious. His affect is not inappropriate. He is not agitated. He is communicative. He is attentive.   Nursing note and vitals reviewed.      LABS: I personally reviewed pertinent laboratory findings.    Lab Results   Component Value Date    ALT 21 05/21/2018    AST 17 05/21/2018    ALKPHOS 91 05/21/2018    BILITOT 0.9 05/21/2018       Lab Results   Component Value Date    WBC 5.98 05/21/2018    HGB 13.8 (L) 05/21/2018    HCT 42.4 05/21/2018    MCV 92 05/21/2018    PLT 92 (L) 05/21/2018       Lab Results   Component Value Date     05/21/2018    K 4.0 05/21/2018     05/21/2018    CO2 30 (H) 05/21/2018    BUN 16 05/21/2018    CREATININE 1.1 05/21/2018    CALCIUM 9.7 05/21/2018    ANIONGAP 8 05/21/2018    ESTGFRAFRICA >60.0 05/21/2018    EGFRNONAA >60.0 05/21/2018       Lab Results   Component Value Date    INR 1.0 05/21/2018    INR 1.1 05/08/2018       Lab Results   Component Value Date    SMOOTHMUSCAB Negative 1:40 05/21/2018     No results found for: IRON, TIBC, FERRITIN, SATURATEDIRO  Lab Results   Component Value Date    HEPBCAB Negative 09/15/2014    HEPCAB Negative 09/15/2014     No results found for: TSH  No results found for: VANDA    No results found for: ABORH        No results found for: LABA1C, HGBA1C  No results found for: CHOL  No results found for: HDL  No results found for: LDLCALC  No results found for: TRIG  No results found for: CHOLHDL        Imaging:   I personally reviewed recent cardiology studies  available on the chart and from outside medical records.    I personally reviewed recent imaging studies available on the chart and from outside medical records.        Assessment:  67 y.o. male presenting with     1. Fatty liver disease, nonalcoholic    2. LONGO (nonalcoholic steatohepatitis)        NAFLD/LONGO - biopsy proven, stage 1-2 fibrosis. No histopathological features of AIH.     He had an exposure to methotrexate for a few years. He has no symptoms of liver disease complications.  Autoimmune markers: unremarkable.  Hep B/C: negative    Recommendation(s):  - life-style modifications: weight loss, daily exercise (30 mins of HIIT or cardio), low carb/low fat diet  - control of high cholesterol, if needed with statin drugs or other cholesterol-lowering agents  - vitamin E supplements (no more than 800 mg per day) may help reduce liver fat  - coffee consumption (low caloric): 2-3 cups per day may reduce liver fat  - avoid alcohol consumption  - return in 9 months    A total of 25 minutes were spent face-to-face with the patient during this encounter and over half of that time was spent on counseling and coordination of care.  We discussed in depth the nature of the patient's liver disease, the management plan in details. I also educated the patient about lifestyle modifications which may improve hepatic steatosis, overweight/obesity, insulin resistance and high blood pressure issues. I have provided the patient with an opportunity to ask questions and have all questions answered to his satisfaction.     I have sent communication to the referring physician and/or primary care provider.      Ana Schmitz MD  Staff Physician  Hepatology and Liver Transplant  [facility]  [facility]

## 2018-06-21 ENCOUNTER — HOSPITAL ENCOUNTER (OUTPATIENT)
Dept: RADIOLOGY | Facility: HOSPITAL | Age: 68
Discharge: HOME | End: 2018-06-21
Attending: ORTHOPAEDIC SURGERY
Payer: MEDICARE

## 2018-06-21 DIAGNOSIS — M75.122 COMPLETE ROTATOR CUFF TEAR OR RUPTURE OF LEFT SHOULDER, NOT SPECIFIED AS TRAUMATIC: ICD-10-CM

## 2018-06-21 PROCEDURE — 73200 CT UPPER EXTREMITY W/O DYE: CPT | Mod: 26,LT,, | Performed by: RADIOLOGY

## 2018-06-21 PROCEDURE — 73200 CT UPPER EXTREMITY W/O DYE: CPT | Mod: TC,LT

## 2018-08-29 PROBLEM — E11.9 DIABETES MELLITUS WITH COINCIDENT HYPERTENSION: Status: ACTIVE | Noted: 2018-08-29

## 2018-08-29 PROBLEM — Z79.899 LONG TERM CURRENT USE OF AMIODARONE: Status: ACTIVE | Noted: 2018-08-29

## 2018-08-29 PROBLEM — Z79.01 CURRENT USE OF LONG TERM ANTICOAGULATION: Status: ACTIVE | Noted: 2018-08-29

## 2018-08-29 PROBLEM — Z98.890 HISTORY OF RIGHT-SIDED CAROTID ENDARTERECTOMY: Status: ACTIVE | Noted: 2018-08-29

## 2018-08-29 PROBLEM — I10 DIABETES MELLITUS WITH COINCIDENT HYPERTENSION: Status: ACTIVE | Noted: 2018-08-29

## 2018-08-29 PROBLEM — I48.0 PAROXYSMAL ATRIAL FIBRILLATION: Status: ACTIVE | Noted: 2018-08-29

## 2018-08-29 PROBLEM — E66.01 OBESITY, CLASS III, BMI 40-49.9 (MORBID OBESITY): Status: ACTIVE | Noted: 2018-08-29

## 2018-08-29 PROBLEM — E78.5 DYSLIPIDEMIA: Status: ACTIVE | Noted: 2018-08-29

## 2018-08-29 PROBLEM — I10 ESSENTIAL HYPERTENSION: Status: ACTIVE | Noted: 2018-08-29

## 2018-08-29 NOTE — PROGRESS NOTES
Subjective:     HPI    PCP: John López MD  Cardiologist: Magdalena Webster MD    I had the pleasure of seeing Daniel Miller in consultation at your request for the evaluation of atrial fibrillation. He is a 68 year old male with a history of HTN, HLD, DM2, ISIDRO on CPAP, TIAs in 2005 which eventually led to a right carotid endarterectomy, and obesity, whose history of AF dates back to 1/2017 when he was brought to the [facility] for food poisoning and was found to be in AF. He had paroxysms of AF during that hospitalization. He was started on Eliquis around this time. Mr. Miller continued to have regular episodes of AF, which he thinks were occurring almost daily. Associated symptoms include fatigue, shortness of breath, and tremulousness. He was started on Amiodarone 2-3 months ago, which reduced AF frequency but did not eliminate it. He presents to me today to discuss management options.    Recent cardiac studies include a 14 day event monitor worn in 1/2018 which showed a 27% AF burden. An echo performed in 1/2018 showed an EF of 60%, LA diameter 4 cm, septal wall thickness 1 cm, and no significant valvular disease.     My interpretation of today's ECG is NSR at 64 bpm with inferior Q-waves.    Review of Systems   Constitution: Positive for malaise/fatigue. Negative for decreased appetite, weight gain and weight loss.   HENT: Negative for sore throat.    Eyes: Negative for blurred vision.   Cardiovascular: Negative for chest pain, dyspnea on exertion, irregular heartbeat, leg swelling, near-syncope, orthopnea, palpitations, paroxysmal nocturnal dyspnea and syncope.   Respiratory: Positive for shortness of breath.    Skin: Negative for rash.   Musculoskeletal: Negative for arthritis.   Gastrointestinal: Negative for abdominal pain.   Neurological: Positive for tremors. Negative for focal weakness.   Psychiatric/Behavioral: Negative for altered mental status.        Objective:    Physical Exam    Constitutional: He is oriented to person, place, and time. He appears well-developed and well-nourished. No distress.   HENT:   Head: Normocephalic and atraumatic.   Mouth/Throat: Oropharynx is clear and moist.   Eyes: Pupils are equal, round, and reactive to light. No scleral icterus.   Neck: Neck supple. No thyromegaly present.   Cardiovascular: Regular rhythm, normal heart sounds and normal pulses. Exam reveals no gallop and no friction rub.   No murmur heard.  Pulmonary/Chest: Effort normal and breath sounds normal. He has no rales.   Abdominal: Soft. Bowel sounds are normal. He exhibits no distension. There is no tenderness.   Musculoskeletal: He exhibits no edema.   Neurological: He is alert and oriented to person, place, and time.   Skin: Skin is warm and dry. No rash noted.   Psychiatric: He has a normal mood and affect. His behavior is normal.         Assessment:       1. Paroxysmal atrial fibrillation    2. Current use of long term anticoagulation    3. Long term current use of amiodarone    4. Essential hypertension    5. Dyslipidemia    6. Diabetes mellitus with coincident hypertension    7. History of right-sided carotid endarterectomy    8. Obesity, Class III, BMI 40-49.9 (morbid obesity)         Plan:       In summary, Daniel Miller is a 68 year old male with a history of PAF which is refractory to Amiodarone. His [id number] Score is 6 (age, vascular disease, HTN, TIA, DM2) so he should remain on anticoagulation indefinitely. We discussed in detail the pathophysiology of AF as well as the myriad of treatment options available to manage it including alternative antiarrhythmics and catheter ablation. We specifically discussed the risks, benefits, indications, and alternatives to PVI. Risks discussed include bleeding, hematoma, vascular damage, cardiac tamponade, stroke, PV stenosis, AE fistula, phrenic nerve damage, and death.  After considering his options he has decided to proceed. Amiodarone  will be stopped 2 weeks prior to the procedure, and Eliquis will be held the AM of the procedure. Cardiac CT to define LA anatomy will be performed through [facility].    Thank you for allowing me to participate in the care of this patient. Please do not hesitate to call me with any questions or concerns.

## 2018-08-30 ENCOUNTER — HOSPITAL ENCOUNTER (OUTPATIENT)
Dept: CARDIOLOGY | Facility: CLINIC | Age: 68
Discharge: HOME | End: 2018-08-30
Payer: MEDICARE

## 2018-08-30 ENCOUNTER — TELEPHONE (OUTPATIENT)
Dept: ELECTROPHYSIOLOGY | Facility: CLINIC | Age: 68
End: 2018-08-30

## 2018-08-30 ENCOUNTER — OFFICE VISIT (OUTPATIENT)
Dept: ELECTROPHYSIOLOGY | Facility: CLINIC | Age: 68
End: 2018-08-30
Payer: MEDICARE

## 2018-08-30 VITALS
HEIGHT: 70 IN | DIASTOLIC BLOOD PRESSURE: 74 MMHG | SYSTOLIC BLOOD PRESSURE: 132 MMHG | BODY MASS INDEX: 42.38 KG/M2 | WEIGHT: 296.06 LBS | HEART RATE: 64 BPM

## 2018-08-30 DIAGNOSIS — I48.91 ATRIAL FIBRILLATION, UNSPECIFIED TYPE: ICD-10-CM

## 2018-08-30 DIAGNOSIS — I10 ESSENTIAL HYPERTENSION: ICD-10-CM

## 2018-08-30 DIAGNOSIS — Z79.899 LONG TERM CURRENT USE OF AMIODARONE: ICD-10-CM

## 2018-08-30 DIAGNOSIS — Z98.890 HISTORY OF RIGHT-SIDED CAROTID ENDARTERECTOMY: ICD-10-CM

## 2018-08-30 DIAGNOSIS — E78.5 DYSLIPIDEMIA: ICD-10-CM

## 2018-08-30 DIAGNOSIS — I48.91 ATRIAL FIBRILLATION, UNSPECIFIED TYPE: Primary | ICD-10-CM

## 2018-08-30 DIAGNOSIS — I48.0 PAROXYSMAL ATRIAL FIBRILLATION: ICD-10-CM

## 2018-08-30 DIAGNOSIS — E11.9 DIABETES MELLITUS WITH COINCIDENT HYPERTENSION: ICD-10-CM

## 2018-08-30 DIAGNOSIS — Z79.01 CURRENT USE OF LONG TERM ANTICOAGULATION: ICD-10-CM

## 2018-08-30 DIAGNOSIS — G47.33 OBSTRUCTIVE SLEEP APNEA: Primary | ICD-10-CM

## 2018-08-30 DIAGNOSIS — E66.01 OBESITY, CLASS III, BMI 40-49.9 (MORBID OBESITY): ICD-10-CM

## 2018-08-30 DIAGNOSIS — I10 DIABETES MELLITUS WITH COINCIDENT HYPERTENSION: ICD-10-CM

## 2018-08-30 PROCEDURE — 99999 PR PBB SHADOW E&M-EST. PATIENT-LVL III: CPT | Mod: PBBFAC,,, | Performed by: INTERNAL MEDICINE

## 2018-08-30 PROCEDURE — 99205 OFFICE O/P NEW HI 60 MIN: CPT | Mod: S$GLB,,, | Performed by: INTERNAL MEDICINE

## 2018-08-30 PROCEDURE — 93000 ELECTROCARDIOGRAM COMPLETE: CPT | Mod: S$GLB,,, | Performed by: INTERNAL MEDICINE

## 2018-08-30 RX ORDER — AMIODARONE HYDROCHLORIDE 200 MG/1
TABLET ORAL
COMMUNITY
End: 2018-12-10 | Stop reason: ALTCHOICE

## 2018-08-30 NOTE — LETTER
August 30, 2018      [facility]  [address] [facility] [place] [address]           [facility]  [address]  [place] [address]  Phone: [phone]  Fax: [phone]          Patient: Daniel Miller   MR Number: [id number]   YOB: 1950   Date of Visit: 8/30/2018       Dear Natalia Pineda:    Thank you for referring Daniel Miller to me for evaluation. Attached you will find relevant portions of my assessment and plan of care.    If you have questions, please do not hesitate to call me. I look forward to following Daniel Miller along with you.    Sincerely,    Murtaza Potter MD    Enclosure  CC:  No Recipients    If you would like to receive this communication electronically, please contact [email or web address] or [phone] to request more information on [employer] access.    For providers and/or their staff who would like to refer a patient to [facility], please contact us through our one-stop-shop provider referral line, Rachana Colunga, at [phone].    If you feel you have received this communication in error or would no longer like to receive these types of communications, please e-mail [email or web address]

## 2018-09-18 ENCOUNTER — HOSPITAL ENCOUNTER (OUTPATIENT)
Dept: RADIOLOGY | Facility: HOSPITAL | Age: 68
Discharge: HOME | End: 2018-09-18
Attending: PAIN MEDICINE
Payer: MEDICARE

## 2018-09-18 DIAGNOSIS — M47.892 OTHER SPONDYLOSIS, CERVICAL REGION: ICD-10-CM

## 2018-09-18 DIAGNOSIS — M54.2 CERVICAL PAIN: ICD-10-CM

## 2018-09-18 PROCEDURE — 72125 CT NECK SPINE W/O DYE: CPT | Mod: TC

## 2018-09-18 PROCEDURE — 72125 CT NECK SPINE W/O DYE: CPT | Mod: 26,,, | Performed by: RADIOLOGY

## 2018-09-24 NOTE — PROGRESS NOTES
ABLATION EDUCATION CHECKLIST    PRE-PROCEDURE TESTING:    LAB WORK AND  CAT SCAN OF THE CHEST WILL BE DONE AT [facility]        - [facility] WILL CONTACT YOU TO SCHEDULE       DAY OF PROCEDURE:    10/11/18 @ 5:45 AM - ABLATION   Report to Cardiology Waiting Room on 3rd floor of the Hospital    · Do not eat or drink anything after: 12 mn on the night before your procedure  · Please do not wear makeup (especially mascara) when arriving for your procedure    Medications:   · HOLD AMIODARONE 14 DAYS PRIOR TO PROCEDURE. LAST DOSE 9/26/18  · MORNING OF PROCEDURE DO NOT TAKE GLUCOPHAGE OR ELIQUIS  · You may take ALL other morning medications with a sip of water      Directions to the Cardiology Waiting Room  If you park in the Parking Garage:  Take elevators to the 2nd floor  Walk up ramp and turn right by Gold Elevators  Take elevator to the 3rd floor  Upon exiting the elevator, turn away from the clinic areas  Walk long ashraf around to front of hospital to area with windows overlooking [facility]  Check in at Reception Desk  OR  If family is dropping you off:  Have them drop you off at the front of the Hospital  (Near the ER, where all the flags are hung).  Take the E elevators to the 3rd floor.  Check in at the Reception Desk in the waiting room.        · You will be spending the night after your procedure.  · You will need someone to drive you home the day after your procedure.  · Your pain during your procedure will be managed by the ANESTHESIA TEAM    Any need to reschedule or cancel procedures, or any questions regarding your procedures should be addressed directly with the Arrhythmia Department Nurses at the following phone number: [phone]

## 2018-10-10 ENCOUNTER — TELEPHONE (OUTPATIENT)
Dept: ELECTROPHYSIOLOGY | Facility: CLINIC | Age: 68
End: 2018-10-10

## 2018-10-10 ENCOUNTER — ANESTHESIA EVENT (OUTPATIENT)
Dept: MEDSURG UNIT | Facility: HOSPITAL | Age: 68
End: 2018-10-10
Payer: MEDICARE

## 2018-10-10 NOTE — TELEPHONE ENCOUNTER
Contacted Pt to confirm procedure for tomm. Spoke with Pt and reviewed pre op instructions including arrival time of 5:45 am, NPO after MN, and to hold glucophage and eliquis in the morning. Pt voiced understanding and denied any further questions. Pt stated he was coming today and [place] at the [place].

## 2018-10-11 ENCOUNTER — ANESTHESIA (OUTPATIENT)
Dept: MEDSURG UNIT | Facility: HOSPITAL | Age: 68
End: 2018-10-11
Payer: MEDICARE

## 2018-10-11 ENCOUNTER — DOCUMENTATION ONLY (OUTPATIENT)
Dept: CARDIOLOGY | Facility: CLINIC | Age: 68
End: 2018-10-11

## 2018-10-11 ENCOUNTER — TELEPHONE (OUTPATIENT)
Dept: ELECTROPHYSIOLOGY | Facility: CLINIC | Age: 68
End: 2018-10-11

## 2018-10-11 ENCOUNTER — HOSPITAL ENCOUNTER (OUTPATIENT)
Facility: HOSPITAL | Age: 68
Discharge: HOME | End: 2018-10-12
Attending: INTERNAL MEDICINE | Admitting: INTERNAL MEDICINE
Payer: MEDICARE

## 2018-10-11 DIAGNOSIS — Z98.890 STATUS POST RADIOFREQUENCY ABLATION (RFA) OPERATION FOR ARRHYTHMIA: ICD-10-CM

## 2018-10-11 DIAGNOSIS — M06.9 RHEUMATOID ARTHRITIS: ICD-10-CM

## 2018-10-11 DIAGNOSIS — I48.0 PAROXYSMAL ATRIAL FIBRILLATION: Primary | ICD-10-CM

## 2018-10-11 DIAGNOSIS — Z86.79 STATUS POST RADIOFREQUENCY ABLATION (RFA) OPERATION FOR ARRHYTHMIA: ICD-10-CM

## 2018-10-11 DIAGNOSIS — G89.18 POST PROCEDURE DISCOMFORT: ICD-10-CM

## 2018-10-11 DIAGNOSIS — I31.39 EFFUSION, PERICARDIUM: ICD-10-CM

## 2018-10-11 DIAGNOSIS — I48.91 ATRIAL FIBRILLATION: ICD-10-CM

## 2018-10-11 LAB
ABO + RH BLD: NORMAL
BLD GP AB SCN CELLS X3 SERPL QL: NORMAL
DIASTOLIC DYSFUNCTION: NO
DIASTOLIC DYSFUNCTION: NO
POCT GLUCOSE: 132 MG/DL (ref 70–110)
POCT GLUCOSE: 136 MG/DL (ref 70–110)
RETIRED EF AND QEF - SEE NOTES: 55 (ref 55–65)
RETIRED EF AND QEF - SEE NOTES: 65 (ref 55–65)

## 2018-10-11 PROCEDURE — 25000003 PHARM REV CODE 250: Performed by: NURSE PRACTITIONER

## 2018-10-11 PROCEDURE — 93010 ELECTROCARDIOGRAM REPORT: CPT | Mod: ,,, | Performed by: INTERNAL MEDICINE

## 2018-10-11 PROCEDURE — 25000003 PHARM REV CODE 250

## 2018-10-11 PROCEDURE — 25000003 PHARM REV CODE 250: Performed by: INTERNAL MEDICINE

## 2018-10-11 PROCEDURE — 63600175 PHARM REV CODE 636 W HCPCS

## 2018-10-11 PROCEDURE — 36620 INSERTION CATHETER ARTERY: CPT | Mod: 59,,, | Performed by: ANESTHESIOLOGY

## 2018-10-11 PROCEDURE — 86901 BLOOD TYPING SEROLOGIC RH(D): CPT

## 2018-10-11 PROCEDURE — 25000003 PHARM REV CODE 250: Performed by: NURSE ANESTHETIST, CERTIFIED REGISTERED

## 2018-10-11 PROCEDURE — D9220A PRA ANESTHESIA: Mod: CRNA,,, | Performed by: NURSE ANESTHETIST, CERTIFIED REGISTERED

## 2018-10-11 PROCEDURE — 82962 GLUCOSE BLOOD TEST: CPT

## 2018-10-11 PROCEDURE — 63600175 PHARM REV CODE 636 W HCPCS: Performed by: NURSE ANESTHETIST, CERTIFIED REGISTERED

## 2018-10-11 PROCEDURE — 93613 INTRACARDIAC EPHYS 3D MAPG: CPT | Mod: ,,, | Performed by: INTERNAL MEDICINE

## 2018-10-11 PROCEDURE — 37000009 HC ANESTHESIA EA ADD 15 MINS: Performed by: INTERNAL MEDICINE

## 2018-10-11 PROCEDURE — 82962 GLUCOSE BLOOD TEST: CPT | Mod: 91 | Performed by: INTERNAL MEDICINE

## 2018-10-11 PROCEDURE — 93308 TTE F-UP OR LMTD: CPT | Mod: 26,,, | Performed by: INTERNAL MEDICINE

## 2018-10-11 PROCEDURE — 93005 ELECTROCARDIOGRAM TRACING: CPT

## 2018-10-11 PROCEDURE — D9220A PRA ANESTHESIA: Mod: ANES,,, | Performed by: ANESTHESIOLOGY

## 2018-10-11 PROCEDURE — 93308 TTE F-UP OR LMTD: CPT

## 2018-10-11 PROCEDURE — 93010 ELECTROCARDIOGRAM REPORT: CPT | Mod: 76,59,, | Performed by: INTERNAL MEDICINE

## 2018-10-11 PROCEDURE — 37000008 HC ANESTHESIA 1ST 15 MINUTES: Performed by: INTERNAL MEDICINE

## 2018-10-11 PROCEDURE — C1766 INTRO/SHEATH,STRBLE,NON-PEEL: HCPCS

## 2018-10-11 PROCEDURE — 93662 INTRACARDIAC ECG (ICE): CPT | Mod: 26,,, | Performed by: INTERNAL MEDICINE

## 2018-10-11 PROCEDURE — 93656 COMPRE EP EVAL ABLTJ ATR FIB: CPT | Mod: ,,, | Performed by: INTERNAL MEDICINE

## 2018-10-11 PROCEDURE — 93321 DOPPLER ECHO F-UP/LMTD STD: CPT | Mod: 26,,, | Performed by: INTERNAL MEDICINE

## 2018-10-11 PROCEDURE — 25000242 PHARM REV CODE 250 ALT 637 W/ HCPCS: Performed by: NURSE ANESTHETIST, CERTIFIED REGISTERED

## 2018-10-11 RX ORDER — ROCURONIUM BROMIDE 10 MG/ML
INJECTION, SOLUTION INTRAVENOUS
Status: DISCONTINUED | OUTPATIENT
Start: 2018-10-11 | End: 2018-10-11

## 2018-10-11 RX ORDER — ALLOPURINOL 300 MG/1
300 TABLET ORAL DAILY
Status: DISCONTINUED | OUTPATIENT
Start: 2018-10-11 | End: 2018-10-12 | Stop reason: HOSPADM

## 2018-10-11 RX ORDER — FUROSEMIDE 20 MG/1
20 TABLET ORAL 2 TIMES DAILY
Status: DISCONTINUED | OUTPATIENT
Start: 2018-10-11 | End: 2018-10-11

## 2018-10-11 RX ORDER — PRAMIPEXOLE DIHYDROCHLORIDE 0.12 MG/1
0.12 TABLET ORAL NIGHTLY PRN
Status: DISCONTINUED | OUTPATIENT
Start: 2018-10-11 | End: 2018-10-12 | Stop reason: HOSPADM

## 2018-10-11 RX ORDER — FENTANYL CITRATE 50 UG/ML
25 INJECTION, SOLUTION INTRAMUSCULAR; INTRAVENOUS EVERY 5 MIN PRN
Status: DISCONTINUED | OUTPATIENT
Start: 2018-10-11 | End: 2018-10-12 | Stop reason: HOSPADM

## 2018-10-11 RX ORDER — PHENYLEPHRINE HYDROCHLORIDE 10 MG/ML
INJECTION INTRAVENOUS
Status: DISCONTINUED | OUTPATIENT
Start: 2018-10-11 | End: 2018-10-11

## 2018-10-11 RX ORDER — FUROSEMIDE 20 MG/1
20 TABLET ORAL 2 TIMES DAILY
Status: DISCONTINUED | OUTPATIENT
Start: 2018-10-11 | End: 2018-10-12 | Stop reason: HOSPADM

## 2018-10-11 RX ORDER — ESCITALOPRAM OXALATE 20 MG/1
20 TABLET ORAL DAILY
Status: DISCONTINUED | OUTPATIENT
Start: 2018-10-11 | End: 2018-10-12 | Stop reason: HOSPADM

## 2018-10-11 RX ORDER — LIDOCAINE HCL/PF 100 MG/5ML
SYRINGE (ML) INTRAVENOUS
Status: DISCONTINUED | OUTPATIENT
Start: 2018-10-11 | End: 2018-10-11

## 2018-10-11 RX ORDER — TAMSULOSIN HYDROCHLORIDE 0.4 MG/1
0.4 CAPSULE ORAL DAILY
Status: DISCONTINUED | OUTPATIENT
Start: 2018-10-11 | End: 2018-10-12 | Stop reason: HOSPADM

## 2018-10-11 RX ORDER — PROPOFOL 10 MG/ML
VIAL (ML) INTRAVENOUS
Status: DISCONTINUED | OUTPATIENT
Start: 2018-10-11 | End: 2018-10-11

## 2018-10-11 RX ORDER — PROTAMINE SULFATE 10 MG/ML
INJECTION, SOLUTION INTRAVENOUS
Status: DISCONTINUED | OUTPATIENT
Start: 2018-10-11 | End: 2018-10-11

## 2018-10-11 RX ORDER — AMIODARONE HYDROCHLORIDE 200 MG/1
200 TABLET ORAL DAILY
Status: DISCONTINUED | OUTPATIENT
Start: 2018-10-11 | End: 2018-10-12 | Stop reason: HOSPADM

## 2018-10-11 RX ORDER — SUCCINYLCHOLINE CHLORIDE 20 MG/ML
INJECTION INTRAMUSCULAR; INTRAVENOUS
Status: DISCONTINUED | OUTPATIENT
Start: 2018-10-11 | End: 2018-10-11

## 2018-10-11 RX ORDER — HEPARIN SOD,PORCINE/0.9 % NACL 250/250 ML
INTRAVENOUS SOLUTION INTRAVENOUS CONTINUOUS PRN
Status: DISCONTINUED | OUTPATIENT
Start: 2018-10-11 | End: 2018-10-11

## 2018-10-11 RX ORDER — SODIUM CHLORIDE 0.9 % (FLUSH) 0.9 %
3 SYRINGE (ML) INJECTION
Status: DISCONTINUED | OUTPATIENT
Start: 2018-10-11 | End: 2018-10-12 | Stop reason: HOSPADM

## 2018-10-11 RX ORDER — HEPARIN SODIUM 1000 [USP'U]/ML
INJECTION, SOLUTION INTRAVENOUS; SUBCUTANEOUS
Status: DISCONTINUED | OUTPATIENT
Start: 2018-10-11 | End: 2018-10-11

## 2018-10-11 RX ORDER — FUROSEMIDE 10 MG/ML
INJECTION INTRAMUSCULAR; INTRAVENOUS
Status: DISCONTINUED | OUTPATIENT
Start: 2018-10-11 | End: 2018-10-11

## 2018-10-11 RX ORDER — FENTANYL CITRATE 50 UG/ML
INJECTION, SOLUTION INTRAMUSCULAR; INTRAVENOUS
Status: DISCONTINUED | OUTPATIENT
Start: 2018-10-11 | End: 2018-10-11

## 2018-10-11 RX ORDER — TRAMADOL HYDROCHLORIDE 50 MG/1
50 TABLET ORAL EVERY 6 HOURS PRN
Status: DISCONTINUED | OUTPATIENT
Start: 2018-10-11 | End: 2018-10-12 | Stop reason: HOSPADM

## 2018-10-11 RX ORDER — ALBUTEROL SULFATE 90 UG/1
AEROSOL, METERED RESPIRATORY (INHALATION)
Status: DISCONTINUED | OUTPATIENT
Start: 2018-10-11 | End: 2018-10-11

## 2018-10-11 RX ORDER — SODIUM CHLORIDE 9 MG/ML
INJECTION, SOLUTION INTRAVENOUS CONTINUOUS
Status: DISCONTINUED | OUTPATIENT
Start: 2018-10-11 | End: 2018-10-11

## 2018-10-11 RX ORDER — MIDAZOLAM HYDROCHLORIDE 1 MG/ML
INJECTION, SOLUTION INTRAMUSCULAR; INTRAVENOUS
Status: DISCONTINUED | OUTPATIENT
Start: 2018-10-11 | End: 2018-10-11

## 2018-10-11 RX ORDER — ACETAMINOPHEN 325 MG/1
650 TABLET ORAL EVERY 6 HOURS PRN
Status: DISCONTINUED | OUTPATIENT
Start: 2018-10-11 | End: 2018-10-12 | Stop reason: HOSPADM

## 2018-10-11 RX ORDER — PANTOPRAZOLE SODIUM 40 MG/1
40 TABLET, DELAYED RELEASE ORAL DAILY
Status: DISCONTINUED | OUTPATIENT
Start: 2018-10-11 | End: 2018-10-12 | Stop reason: HOSPADM

## 2018-10-11 RX ORDER — GLYCOPYRROLATE 0.2 MG/ML
INJECTION INTRAMUSCULAR; INTRAVENOUS
Status: DISCONTINUED | OUTPATIENT
Start: 2018-10-11 | End: 2018-10-11

## 2018-10-11 RX ORDER — ONDANSETRON 2 MG/ML
INJECTION INTRAMUSCULAR; INTRAVENOUS
Status: DISCONTINUED | OUTPATIENT
Start: 2018-10-11 | End: 2018-10-11

## 2018-10-11 RX ADMIN — HEPARIN SODIUM 5000 UNITS: 1000 INJECTION INTRAVENOUS; SUBCUTANEOUS at 08:10

## 2018-10-11 RX ADMIN — LIDOCAINE HYDROCHLORIDE 100 MG: 20 INJECTION, SOLUTION INTRAVENOUS at 07:10

## 2018-10-11 RX ADMIN — PROTAMINE SULFATE 10 MG: 10 INJECTION, SOLUTION INTRAVENOUS at 10:10

## 2018-10-11 RX ADMIN — ROCURONIUM BROMIDE 10 MG: 10 INJECTION, SOLUTION INTRAVENOUS at 07:10

## 2018-10-11 RX ADMIN — HEPARIN SODIUM 4000 UNITS: 1000 INJECTION INTRAVENOUS; SUBCUTANEOUS at 10:10

## 2018-10-11 RX ADMIN — PHENYLEPHRINE HYDROCHLORIDE 200 MCG: 10 INJECTION INTRAVENOUS at 08:10

## 2018-10-11 RX ADMIN — ACETAMINOPHEN 650 MG: 325 TABLET, FILM COATED ORAL at 01:10

## 2018-10-11 RX ADMIN — FUROSEMIDE 20 MG: 20 TABLET ORAL at 06:10

## 2018-10-11 RX ADMIN — AMIODARONE HYDROCHLORIDE 200 MG: 200 TABLET ORAL at 02:10

## 2018-10-11 RX ADMIN — SUCCINYLCHOLINE CHLORIDE 200 MG: 20 INJECTION, SOLUTION INTRAMUSCULAR; INTRAVENOUS at 07:10

## 2018-10-11 RX ADMIN — HEPARIN SODIUM 6000 UNITS: 1000 INJECTION INTRAVENOUS; SUBCUTANEOUS at 09:10

## 2018-10-11 RX ADMIN — PROTAMINE SULFATE 20 MG: 10 INJECTION, SOLUTION INTRAVENOUS at 10:10

## 2018-10-11 RX ADMIN — ALBUTEROL SULFATE 8 PUFF: 90 AEROSOL, METERED RESPIRATORY (INHALATION) at 09:10

## 2018-10-11 RX ADMIN — PANTOPRAZOLE SODIUM 40 MG: 40 TABLET, DELAYED RELEASE ORAL at 02:10

## 2018-10-11 RX ADMIN — SODIUM CHLORIDE 0.5 MCG/KG/MIN: 9 INJECTION, SOLUTION INTRAVENOUS at 08:10

## 2018-10-11 RX ADMIN — APIXABAN 5 MG: 5 TABLET, FILM COATED ORAL at 08:10

## 2018-10-11 RX ADMIN — FENTANYL CITRATE 100 MCG: 50 INJECTION, SOLUTION INTRAMUSCULAR; INTRAVENOUS at 07:10

## 2018-10-11 RX ADMIN — FUROSEMIDE 40 MG: 10 INJECTION, SOLUTION INTRAMUSCULAR; INTRAVENOUS at 11:10

## 2018-10-11 RX ADMIN — ONDANSETRON 4 MG: 2 INJECTION INTRAMUSCULAR; INTRAVENOUS at 11:10

## 2018-10-11 RX ADMIN — FENTANYL CITRATE 50 MCG: 50 INJECTION, SOLUTION INTRAMUSCULAR; INTRAVENOUS at 09:10

## 2018-10-11 RX ADMIN — HEPARIN SODIUM 5000 UNITS/HR: 1000 INJECTION, SOLUTION INTRAVENOUS; SUBCUTANEOUS at 08:10

## 2018-10-11 RX ADMIN — ALLOPURINOL 300 MG: 300 TABLET ORAL at 02:10

## 2018-10-11 RX ADMIN — MIDAZOLAM 2 MG: 1 INJECTION INTRAMUSCULAR; INTRAVENOUS at 07:10

## 2018-10-11 RX ADMIN — PHENYLEPHRINE HYDROCHLORIDE 300 MCG: 10 INJECTION INTRAVENOUS at 08:10

## 2018-10-11 RX ADMIN — SODIUM CHLORIDE, SODIUM GLUCONATE, SODIUM ACETATE, POTASSIUM CHLORIDE, MAGNESIUM CHLORIDE, SODIUM PHOSPHATE, DIBASIC, AND POTASSIUM PHOSPHATE: .53; .5; .37; .037; .03; .012; .00082 INJECTION, SOLUTION INTRAVENOUS at 10:10

## 2018-10-11 RX ADMIN — PROPOFOL 160 MG: 10 INJECTION, EMULSION INTRAVENOUS at 07:10

## 2018-10-11 RX ADMIN — TAMSULOSIN HYDROCHLORIDE 0.4 MG: 0.4 CAPSULE ORAL at 02:10

## 2018-10-11 RX ADMIN — PROPOFOL 40 MG: 10 INJECTION, EMULSION INTRAVENOUS at 07:10

## 2018-10-11 RX ADMIN — TRAMADOL HYDROCHLORIDE 50 MG: 50 TABLET, FILM COATED ORAL at 02:10

## 2018-10-11 RX ADMIN — ESCITALOPRAM 20 MG: 20 TABLET, FILM COATED ORAL at 02:10

## 2018-10-11 RX ADMIN — GLYCOPYRROLATE 0.2 MG: 0.2 INJECTION, SOLUTION INTRAMUSCULAR; INTRAVENOUS at 08:10

## 2018-10-11 RX ADMIN — ALBUTEROL SULFATE 4 PUFF: 90 AEROSOL, METERED RESPIRATORY (INHALATION) at 10:10

## 2018-10-11 RX ADMIN — SODIUM CHLORIDE, SODIUM GLUCONATE, SODIUM ACETATE, POTASSIUM CHLORIDE, MAGNESIUM CHLORIDE, SODIUM PHOSPHATE, DIBASIC, AND POTASSIUM PHOSPHATE: .53; .5; .37; .037; .03; .012; .00082 INJECTION, SOLUTION INTRAVENOUS at 07:10

## 2018-10-11 RX ADMIN — SODIUM CHLORIDE: 0.9 INJECTION, SOLUTION INTRAVENOUS at 07:10

## 2018-10-11 RX ADMIN — ALBUTEROL SULFATE 8 PUFF: 90 AEROSOL, METERED RESPIRATORY (INHALATION) at 11:10

## 2018-10-11 RX ADMIN — ALBUTEROL SULFATE 8 PUFF: 90 AEROSOL, METERED RESPIRATORY (INHALATION) at 08:10

## 2018-10-11 NOTE — BRIEF OP NOTE
Patient is s/p PVI ablation:     He possibly had a steam pop. Bedside echo did not reveal any significant pericardial effusion.   Post op care per protocol.  Will monitor in recovery on tele overnight  Repeat limited echo in 4 hrs   If he does not have any effusion and is HD stable - will resume anticoagulation in the evening.   Will give prn tylenol for now  Will use PRN lasix and motrin if repeat echo looks good.     D/w Dr Potter.       Addendum: 3:55 pm:  Limited Echo reviewed   There is no evidence of pericardial effusion. Pericardial fat pad noted.  He has been  HD stable.   Will resume elequis at 5 mg starting tonight , cont amiodarone at 200 mg qd and metop 25 mg bid.

## 2018-10-11 NOTE — TELEPHONE ENCOUNTER
Encounter opened to look in care everywhere for implant information.      Unable to locate needed information.

## 2018-10-11 NOTE — PROGRESS NOTES
Patient admitted to recovery see [facility] for complete assessment pacu bcg's maintained safety measures verified patient instructed on pain scale and patient verbalized understanding. Patient's blood sugar 148. Also called patient's wife and updated on patient location with the permission of patient. Also ekg done and placed in patient's chart. Also dr. Espinosa here and updated on patient status and condition.

## 2018-10-11 NOTE — TRANSFER OF CARE
Anesthesia Transfer of Care Note    Patient: Daniel Miller    Procedure(s) Performed: Procedure(s) (LRB):  ABLATION (N/A)  ECHOCARDIOGRAM,TRANSESOPHAGEAL (N/A)    Patient location: PACU    Anesthesia Type: general    Transport from OR: Transported from OR on 6-10 L/min O2 by face mask with adequate spontaneous ventilation    Post pain: adequate analgesia    Post assessment: no apparent anesthetic complications and tolerated procedure well    Post vital signs: stable    Level of consciousness: awake, alert and oriented    Nausea/Vomiting: no nausea/vomiting    Complications: none    Transfer of care protocol was followed      Last vitals: There were no vitals taken for this visit.

## 2018-10-11 NOTE — PROGRESS NOTES
Patient identified by 2 identifiers. Denies previous reactions to blood transfusions and allergies reviewed.  Procedure explained & consent obtained.  20 g IV in place to Rt FA, flushed w/ 10cc NS pre & post contrast administration.  3cc Optison administered, echo images obtained.  Pt tolerated procedure well.

## 2018-10-11 NOTE — PLAN OF CARE
Problem: Patient Care Overview  Goal: Plan of Care Review  Outcome: Ongoing (interventions implemented as appropriate)  Received report from MARCIAL Granados. Patient s/p PVI RFA, AAOx3. VSS, no c/o pain or discomfort at this time, resp even and unlabored. Sutures intact to bilateral groin sites. No active bleeding. No hematoma noted. Post procedure protocol reviewed with patient and patient's family. Understanding verbalized. Family members at bedside. Nurse call bell within reach. Will continue to monitor per post procedure protocol.

## 2018-10-11 NOTE — NURSING
Sutures removed from bilateral groin sites. Gauze and tegaderm dressing applied. No hematoma or active bleeding noted. Will continue to monitor.

## 2018-10-11 NOTE — ANESTHESIA PREPROCEDURE EVALUATION
10/11/2018  Daniel Miller is a 68 y.o., male presenting for LISA and a fib ablation.  He has significant ISIDRO hx on CPAP.    Past Medical History:   Diagnosis Date    Actinic keratosis 9/2/2016    Acute ITP 2/6/2008    Arthritis     Cancer of skin of right upper extremity 9/2/2016    Degenerative disc disease     Diabetes mellitus     Gout     Hypertension     Kidney stone     Malignant neoplasm of skin of right upper extremity 10/24/2016    Malignant neoplasm of skin of right upper extremity 10/24/2016    Nephrolithiasis     Rheumatoid arthritis     RLS (restless legs syndrome)     Stroke 2003; 2005 x 2     2 or 3 times (affected his speech and weak all over); ear doctor said vertigo in 5-2015 may have had a mild stroke    Thrombocytopenia     Thyroid disease     Vertigo 5/2015    Ear doctor said it may have been a mild stroke     Past Surgical History:   Procedure Laterality Date    APPENDECTOMY  1990    ARTHROSCOPY-KNEE Left 5/11/2017    Per[facility] at [facility]    CAROTID ENDARTERECTOMY Right 2004    CHOLECYSTECTOMY  2011    CYSTOSCOPY AND DIGITAL RECTAL EXAMINATION N/A 6/23/2015    Performed by Teto Clay MD at [facility] OR    EXTRACORPOREAL SHOCK WAVE LITHOTRIPSY  2000    EYE SURGERY Bilateral 2012    Cataract surgery with lens implant    HERNIA REPAIR  2011    Umbilical hernia    [facility]    PARTIAL MEDIAL MENISCECTOMY Left 5/11/2017    Performed by [facility]ian [facility] at [facility] OR    ROTATOR CUFF REPAIR Right 2008    TRANSJUGULAR BIOPSY OF LIVER N/A 5/29/2018    Procedure: Transjugular liver biopsy;  Surgeon: [facility];  Location: [facility] OR [address] [place] [address];  Service: Radiology;  Laterality: N/A;    Transjugular liver biopsy N/A 5/29/2018    Performed by [facility] Diagnostic Provider at [facility] OR [address] [place] [address]     Review of patient's  allergies indicates:   Allergen Reactions    Rotigotine Hives    Lipitor [atorvastatin] Hives     No current facility-administered medications on file prior to encounter.      Current Outpatient Medications on File Prior to Encounter   Medication Sig Dispense Refill    allopurinol (ZYLOPRIM) 300 MG tablet Take 300 mg by mouth once daily.   5    amiodarone (PACERONE) 200 MG Tab amiodarone 200 mg tablet   1 po daily      aspirin (ECOTRIN) 81 MG EC tablet Take 81 mg by mouth once daily.      escitalopram oxalate (LEXAPRO) 20 MG tablet Take 20 mg by mouth once daily.       fish oil-omega-3 fatty acids 300-1,000 mg capsule Take 2 g by mouth 2 (two) times daily.       folic acid (FOLVITE) 1 MG tablet Take 1,000 mcg by mouth once daily.  4    furosemide (LASIX) 20 MG tablet Take 20 mg by mouth 2 (two) times daily.  5    magnesium oxide (MAG-OX) 400 mg tablet Take 1 tablet by mouth 2 (two) times daily.  1    meclizine (ANTIVERT) 25 mg tablet Take 25 mg by mouth every evening.   3    metoprolol tartrate (LOPRESSOR) 25 MG tablet Take 25 mg by mouth 2 (two) times daily.      omega-3 acid ethyl esters (LOVAZA) 1 gram capsule Take 2 g by mouth 2 (two) times daily.      pantoprazole (PROTONIX) 40 MG tablet Take 40 mg by mouth once daily.      pramipexole (MIRAPEX) 0.125 MG tablet Take 0.125 mg by mouth nightly as needed.  5    rosuvastatin (CRESTOR) 20 MG tablet Take 20 mg by mouth once daily.      SIMETHICONE ORAL Take 160 mg by mouth once daily.      tamsulosin (FLOMAX) 0.4 mg Cp24 Take 1 capsule (0.4 mg total) by mouth once daily. 30 capsule 5    tramadol (ULTRAM) 50 mg tablet TAKE 1 TABLET BY MOUTH EVERY 6 HOURS AS NEEDED FOR PAIN 120 tablet 1    buPROPion (WELLBUTRIN XL) 300 MG 24 hr tablet TAKE 1 TABLET(S) EVERY DAY BY ORAL ROUTE.  1    ELIQUIS 2.5 mg Tab Take 2.5 mg by mouth 2 (two) times daily.       ergocalciferol (VITAMIN D2) 50,000 unit Cap Take 1,000 Units by mouth every 7 days.      glimepiride  (AMARYL) 2 MG tablet Take 2 mg by mouth before breakfast.       Lab Results   Component Value Date    WBC 7.00 09/17/2018    HGB 14.3 09/17/2018    HCT 41.3 09/17/2018    MCV 91 09/17/2018    PLT 87 (L) 09/17/2018     BMP  Lab Results   Component Value Date     09/17/2018    K 3.8 09/17/2018     09/17/2018    CO2 28 09/17/2018    BUN 22 (H) 09/17/2018    CREATININE 1.20 09/17/2018    CALCIUM 9.3 09/17/2018    ANIONGAP 8 05/21/2018    ESTGFRAFRICA >60 09/17/2018    EGFRNONAA >60 09/17/2018         Anesthesia Evaluation    I have reviewed the Patient Summary Reports.     I have reviewed the Medications.     Review of Systems  Anesthesia Hx:  No problems with previous Anesthesia   Denies Personal Hx of Anesthesia complications.   Hematology/Oncology:        Hematology Comments: thrombocytopenia   Cardiovascular:   Hypertension Dysrhythmias atrial fibrillation WALTERS    Pulmonary:   Denies COPD.  Denies Asthma. Sleep Apnea, CPAP    Renal/:   Chronic Renal Disease, CRI    Hepatic/GI:   GERD, well controlled Liver Disease, LONGO   Neurological:   TIA, Denies Seizures. No residual symptoms  Peripheral Neuropathy    Endocrine:   Diabetes        Physical Exam  General:  Morbid Obesity    Airway/Jaw/Neck:  Airway Findings: Mouth Opening: Small, but > 3cm Tongue: Normal  General Airway Assessment: Adult  Mallampati: III  Improves to II with phonation.  Jaw/Neck Findings:  Neck ROM: Normal ROM      Dental:  Dental Findings: upper partial dentures, lower partial dentures        Mental Status:  Mental Status Findings:  Cooperative, Alert and Oriented         Anesthesia Plan  Type of Anesthesia, risks & benefits discussed:  Anesthesia Type:  general  Patient's Preference:   Intra-op Monitoring Plan: standard ASA monitors and arterial line  Intra-op Monitoring Plan Comments:   Post Op Pain Control Plan: per primary service following discharge from PACU and IV/PO Opioids PRN  Post Op Pain Control Plan Comments:    Induction:   IV  Beta Blocker:  Patient is on a Beta-Blocker and has received one dose within the past 24 hours (No further documentation required).       Informed Consent: Patient understands risks and agrees with Anesthesia plan.  Questions answered. Anesthesia consent signed with patient.  ASA Score: 3     Day of Surgery Review of History & Physical:    H&P update referred to the surgeon.         Ready For Surgery From Anesthesia Perspective.

## 2018-10-11 NOTE — NURSING TRANSFER
Nursing Transfer Note      10/11/2018     Transfer To: 315    Transfer via stretcher    Transfer with 2liters to O2, cardiac monitoring    Transported by mercedes rn    Medicines sent: none    Chart send with patient: Yes    Notified: nurse    Patient reassessed at: see epic (date, time)    Upon arrival to floor: to room no complaints no distress noted.

## 2018-10-11 NOTE — ANESTHESIA PROCEDURE NOTES
Arterial    Diagnosis: Atrial fibrillation  Doctor requesting consult: ricardo    Patient location during procedure: done in OR  Procedure start time: 10/11/2018 7:48 AM  Timeout: 10/11/2018 7:48 AM  Procedure end time: 10/11/2018 7:50 AM  Staffing  Anesthesiologist: Gerry Polanco MD  Performed: anesthesiologist   Anesthesiologist was present at the time of the procedure.  Preanesthetic Checklist  Completed: patient identified, site marked, surgical consent, pre-op evaluation, timeout performed, IV checked, risks and benefits discussed, monitors and equipment checked and anesthesia consent givenArterial  Skin Prep: chlorhexidine gluconate  Orientation: right  Location: radial  Catheter Size: 20 G  Catheter placement by Anatomical landmarks. Heme positive aspiration all ports.Insertion Attempts: 2  Assessment  Dressing: secured with tape and tegaderm  Patient: Tolerated well

## 2018-10-11 NOTE — PROGRESS NOTES
Called to give report to kirstie stark and she is getting report on a cath lab patient and will call me back.

## 2018-10-11 NOTE — ANESTHESIA POSTPROCEDURE EVALUATION
Anesthesia Post Evaluation    Patient: Daniel Miller    Procedure(s) Performed: Procedure(s) (LRB):  ABLATION (N/A)  ECHOCARDIOGRAM,TRANSESOPHAGEAL (N/A)    Final Anesthesia Type: general  Patient location during evaluation: PACU  Patient participation: Yes- Able to Participate  Level of consciousness: awake and alert  Post-procedure vital signs: reviewed and stable  Pain management: adequate  Airway patency: patent  PONV status at discharge: No PONV  Anesthetic complications: no      Cardiovascular status: hemodynamically stable  Respiratory status: nasal cannula  Hydration status: euvolemic  Follow-up not needed.        Visit Vitals  BP (!) 121/57 (BP Location: Left arm, Patient Position: Lying)   Pulse 68   Temp 36.6 °C (97.8 °F) (Oral)   Resp 20   SpO2 97%       Pain/Omar Score: Pain Assessment Performed: Yes (10/11/2018  1:45 PM)  Presence of Pain: complains of pain/discomfort (10/11/2018  1:45 PM)  Pain Rating Prior to Med Admin: 4 (10/11/2018  1:30 PM)  Pain Rating Post Med Admin: 3 (10/11/2018  1:45 PM)  Omar Score: 9 (10/11/2018  1:30 PM)

## 2018-10-11 NOTE — H&P
Subjective:     HPI    PCP: John López MD  Cardiologist: Magdalena Webster MD    Mr Daniel Miller s here for an elective afib ablation He is a 68 year old male with a history of HTN, HLD, DM2, ISIDRO on CPAP, TIAs in 2005 which eventually led to a right carotid endarterectomy, and obesity, whose history of AF dates back to 1/2017 when he was brought to the [facility] for food poisoning and was found to be in AF. He had paroxysms of AF during that hospitalization. He was started on Eliquis around this time. Mr. Miller continued to have regular episodes of AF, which he thinks were occurring almost daily. Associated symptoms include fatigue, shortness of breath, and tremulousness. He was started on Amiodarone 2-3 months ago, which reduced AF frequency but did not eliminate it. He presents to me today to discuss management options.    - 14 day event monitor worn in 1/2018 which showed a 27% AF burden.  -Echo  1/2018 showed an EF of 60%, LA diameter 4 cm, septal wall thickness 1 cm, and no significant valvular disease.     EKG: NSR :     Review of Systems   Constitution: Positive for malaise/fatigue. Negative for decreased appetite, weight gain and weight loss.   HENT: Negative for sore throat.    Eyes: Negative for blurred vision.   Cardiovascular: Negative for chest pain, dyspnea on exertion, irregular heartbeat, leg swelling, near-syncope, orthopnea, palpitations, paroxysmal nocturnal dyspnea and syncope.   Respiratory: Positive for shortness of breath.    Skin: Negative for rash.   Musculoskeletal: Negative for arthritis.   Gastrointestinal: Negative for abdominal pain.   Neurological: Positive for tremors. Negative for focal weakness.   Psychiatric/Behavioral: Negative for altered mental status.        Objective:    Physical Exam   Constitutional: He is oriented to person, place, and time. He appears well-developed and well-nourished. No distress.   Morbid obesity    HENT:   Head: Normocephalic and  atraumatic.   Mouth/Throat: Oropharynx is clear and moist.   Eyes: Pupils are equal, round, and reactive to light. No scleral icterus.   Neck: Neck supple. No thyromegaly present.   Cardiovascular: Regular rhythm, normal heart sounds and normal pulses. Exam reveals no gallop and no friction rub.   No murmur heard.  1+ edema   Pulmonary/Chest: Effort normal and breath sounds normal. He has no rales.   Abdominal: Soft. Bowel sounds are normal. He exhibits no distension. There is no tenderness.   Musculoskeletal: He exhibits no edema.   Neurological: He is alert and oriented to person, place, and time.   Skin: Skin is warm and dry. No rash noted.   Psychiatric: He has a normal mood and affect. His behavior is normal.         Assessment:       1. Paroxysmal atrial fibrillation    2. Atrial fibrillation         Plan:       In summary, Daniel Miller is a 68 year old male with a history of PAF which is refractory to Amiodarone. His [id number] Score is 6 (age, vascular disease, HTN, TIA, DM2) so he should remain on anticoagulation indefinitely.   He is in NSr today.     We discussed in detail the pathophysiology of AF as well as the myriad of treatment options available to manage it including alternative antiarrhythmics and catheter ablation. We specifically discussed the risks, benefits, indications, and alternatives to PVI. Risks discussed include bleeding, hematoma, vascular damage, cardiac tamponade, stroke, PV stenosis, AE fistula, phrenic nerve damage, and death.  After considering his options he has decided to proceed.

## 2018-10-11 NOTE — NURSING
Dr crystal at bedside to assess right groin site bruising. Pressure held x15 minutes then safeguard placed. Safeguard to remain in place until 7 pm per Dr crystal.

## 2018-10-12 VITALS
RESPIRATION RATE: 20 BRPM | SYSTOLIC BLOOD PRESSURE: 123 MMHG | TEMPERATURE: 99 F | OXYGEN SATURATION: 92 % | HEART RATE: 89 BPM | DIASTOLIC BLOOD PRESSURE: 57 MMHG

## 2018-10-12 LAB
POCT GLUCOSE: 124 MG/DL (ref 70–110)
POCT GLUCOSE: 148 MG/DL (ref 70–110)
POCT GLUCOSE: 161 MG/DL (ref 70–110)

## 2018-10-12 PROCEDURE — 93971 EXTREMITY STUDY: CPT | Mod: 26,,, | Performed by: INTERNAL MEDICINE

## 2018-10-12 PROCEDURE — 82962 GLUCOSE BLOOD TEST: CPT

## 2018-10-12 PROCEDURE — 93926 LOWER EXTREMITY STUDY: CPT | Mod: 26,,, | Performed by: INTERNAL MEDICINE

## 2018-10-12 PROCEDURE — 25000003 PHARM REV CODE 250: Performed by: INTERNAL MEDICINE

## 2018-10-12 PROCEDURE — 93971 EXTREMITY STUDY: CPT

## 2018-10-12 PROCEDURE — 93926 LOWER EXTREMITY STUDY: CPT

## 2018-10-12 RX ORDER — ACETAMINOPHEN 325 MG/1
650 TABLET ORAL EVERY 6 HOURS PRN
Refills: 0 | COMMUNITY
Start: 2018-10-12

## 2018-10-12 RX ADMIN — AMIODARONE HYDROCHLORIDE 200 MG: 200 TABLET ORAL at 08:10

## 2018-10-12 RX ADMIN — FUROSEMIDE 20 MG: 20 TABLET ORAL at 08:10

## 2018-10-12 RX ADMIN — ALLOPURINOL 300 MG: 300 TABLET ORAL at 08:10

## 2018-10-12 RX ADMIN — PANTOPRAZOLE SODIUM 40 MG: 40 TABLET, DELAYED RELEASE ORAL at 08:10

## 2018-10-12 RX ADMIN — ESCITALOPRAM 20 MG: 20 TABLET, FILM COATED ORAL at 08:10

## 2018-10-12 RX ADMIN — APIXABAN 5 MG: 5 TABLET, FILM COATED ORAL at 08:10

## 2018-10-12 RX ADMIN — TAMSULOSIN HYDROCHLORIDE 0.4 MG: 0.4 CAPSULE ORAL at 08:10

## 2018-10-12 NOTE — DISCHARGE INSTRUCTIONS
1. Do not strain or lift anything greater than 5 lb for 1 week.   2. Do not drive or operate any dangerous machinery for 24 hours.   3. Keep the dressing on, clean, and dry for 24 hours.   4. After 24 hours, the dressing may be removed and a shower is allowed.   5. Clean the area with mild soap and water and then leave it opened to air.   6. Once the skin has healed, bathing in a tub or swimming is allowed.   7. Inspect the groin site daily and report to the physician any bleeding and/or swelling at the site that   cannot be controlled with manual pressure for 10 minutes, unusual pain at the   access site or affected extremity, unusual swelling at the access site, or signs or   symptoms of infection such as redness, pain, or fever.   Call 911 if you have:   Bleeding from the puncture site that you cannot stop by doing the following:   Relax and lie down right away. Keep your leg flat and apply firm pressure to the site using your fingers and a gauze pad. Keep the pressure on for 20 minutes. Continue this until the bleeding stops. This may take awhile. When bleeding stops, cover the site with a sterile bandage and keep your leg still as much as possible.

## 2018-10-12 NOTE — PLAN OF CARE
Problem: Patient Care Overview  Goal: Plan of Care Review  Outcome: Ongoing (interventions implemented as appropriate)  Pt returned from echo lab via stretcher escorted by Mallika (in echo). Pt is aaox4. Vss. resp even and unlabored. Bed locked and in low position. Side rails raised x 2. Wife at bedside. Will continue to monitor

## 2018-10-12 NOTE — PLAN OF CARE
Problem: Patient Care Overview  Goal: Plan of Care Review  Outcome: Outcome(s) achieved Date Met: 10/12/18  Patient discharged per MD orders. Instructions given on medications, wound care, activity, signs of infection, when to call MD, and follow up appointments. Pt verbalized understanding.  Patient AAOx3, VSS, no c/o pain or discomfort at this time. Telemetry and PIV removed. Patient left unit via wheelchair with transport and family.

## 2018-10-12 NOTE — PLAN OF CARE
Problem: Patient Care Overview  Goal: Plan of Care Review  Outcome: Ongoing (interventions implemented as appropriate)  Pt is aaox4. vss resp even and unlabored. Bed locked and in low position. Side rails raised x 2. Wife at bedside. Nurse call bell within reach. Will continue to monitor

## 2018-10-12 NOTE — DISCHARGE SUMMARY
[facility]  Discharge Summary      Admit Date: 10/11/2018    Discharge Date and Time:  10/12/2018 5:21 PM    Attending Physician: Murtaza Potter MD     Reason for Admission: elective ablation     Procedures Performed: Procedure(s) (LRB):  ABLATION (N/A)  2d echo     Hospital Course     Patient is s/p PVI ablation:     F/u  echo did not reveal any significant pericardial effusion.   Repeat limited echo in 4 hrs   Cont  anticoagulation with apixiban 5 mg bid, amio and metoprolol.   Will give prn tylenol for now   He has h/o TIA, age < 80, wt > 60kg and Cr 1.2 - he will be on 5 mg bid elequis in-order to provide stroke px till the time there is any obvious contraindication.     He had mild blood tinged sputum this morning. We monitored him 12 hrs post and sputum cleared. This might be traumatic from intubation yesterday. Also he was noted to have right groin skin bruise, no hematoma/bruit noted. Vascular US did not reveal any obvious abnormality. He was asymptomatic from that standpoint,   He was d/s home in a stable condition.   His ST Dhaval spinal stimulator was turned off by the rep from the device company . He was advised to turn it on after reaching home. All instructions given in detail, all questions answered.       Final Diagnoses:    Principal Problem: <principal problem not specified>   Secondary Diagnoses:   Active Hospital Problems    Diagnosis  POA    Atrial fibrillation [I48.91]  Yes    Paroxysmal atrial fibrillation [I48.0]  Yes      Resolved Hospital Problems   No resolved problems to display.       Discharged Condition: stable    Disposition: Home or Self Care    Follow Up/Patient Instructions:     Medications:  Reconciled Home Medications:      Medication List      START taking these medications    acetaminophen 325 MG tablet  Commonly known as:  TYLENOL  Take 2 tablets (650 mg total) by mouth every 6 (six) hours as needed.        CONTINUE taking these medications    allopurinol  300 MG tablet  Commonly known as:  ZYLOPRIM  Take 300 mg by mouth once daily.     amiodarone 200 MG Tab  Commonly known as:  PACERONE  amiodarone 200 mg tablet   1 po daily     aspirin 81 MG EC tablet  Commonly known as:  ECOTRIN  Take 81 mg by mouth once daily.     buPROPion 300 MG 24 hr tablet  Commonly known as:  WELLBUTRIN XL  TAKE 1 TABLET(S) EVERY DAY BY ORAL ROUTE.     ELIQUIS 2.5 mg Tab  Generic drug:  apixaban  Take 2.5 mg by mouth 2 (two) times daily.     escitalopram oxalate 20 MG tablet  Commonly known as:  LEXAPRO  Take 20 mg by mouth once daily.     fish oil-omega-3 fatty acids 300-1,000 mg capsule  Take 2 g by mouth 2 (two) times daily.     folic acid 1 MG tablet  Commonly known as:  FOLVITE  Take 1,000 mcg by mouth once daily.     furosemide 20 MG tablet  Commonly known as:  LASIX  Take 20 mg by mouth 2 (two) times daily.     glimepiride 2 MG tablet  Commonly known as:  AMARYL  Take 2 mg by mouth before breakfast.     lidocaine-prilocaine cream  Commonly known as:  EMLA  lidocaine-prilocaine 2.5 %-2.5 % topical cream     magnesium oxide 400 mg (241.3 mg magnesium) tablet  Commonly known as:  MAG-OX  Take 1 tablet by mouth 2 (two) times daily.     meclizine 25 mg tablet  Commonly known as:  ANTIVERT  Take 25 mg by mouth every evening.     metoprolol tartrate 25 MG tablet  Commonly known as:  LOPRESSOR  Take 25 mg by mouth 2 (two) times daily.     omega-3 acid ethyl esters 1 gram capsule  Commonly known as:  LOVAZA  Take 2 g by mouth 2 (two) times daily.     pantoprazole 40 MG tablet  Commonly known as:  PROTONIX  Take 40 mg by mouth once daily.     pramipexole 0.125 MG tablet  Commonly known as:  MIRAPEX  Take 0.125 mg by mouth nightly as needed.     rosuvastatin 20 MG tablet  Commonly known as:  CRESTOR  Take 20 mg by mouth once daily.     SIMETHICONE ORAL  Take 160 mg by mouth once daily.     tamsulosin 0.4 mg Cap  Commonly known as:  FLOMAX  Take 1 capsule (0.4 mg total) by mouth once daily.      traMADol 50 mg tablet  Commonly known as:  ULTRAM  TAKE 1 TABLET BY MOUTH EVERY 6 HOURS AS NEEDED FOR PAIN     VITAMIN D2 50,000 unit Cap  Generic drug:  ergocalciferol  Take 1,000 Units by mouth every 7 days.        STOP taking these medications    cefPROZIL 250 MG tablet  Commonly known as:  CEFZIL          No discharge procedures on file.  Follow-up Information     Murtaza Potter MD In 6 weeks.    Specialties:  Electrophysiology, Cardiovascular Disease, Cardiology  Contact information:  [address] [place] [id number] [phone]

## 2018-10-13 PROBLEM — Z96.89 SPINAL CORD STIMULATOR STATUS: Status: ACTIVE | Noted: 2018-10-13

## 2018-10-15 ENCOUNTER — TELEPHONE (OUTPATIENT)
Dept: ELECTROPHYSIOLOGY | Facility: CLINIC | Age: 68
End: 2018-10-15

## 2018-10-15 NOTE — TELEPHONE ENCOUNTER
Returned call to Pt's wife. When pt was discharged his eliquis was increased to 5 mg BID. Pt needs new Rx sent. Braulio sent to [facility] in [place]        ----- Message from Roma Levy sent at 10/15/2018 11:35 AM CDT -----  Contact: Pt wife   Pt wife calling about medication ELIQUIS 2.5 mg Tab. Please call pt wife [phone]. Thank you.

## 2018-10-16 NOTE — TELEPHONE ENCOUNTER
Returned call to Pt's wife. Rx for Eliquis called to pharmacy        ----- Message from Flaco Neville MA sent at 10/15/2018  4:38 PM CDT -----  Contact: Pt wife      ----- Message -----  From: Adali Garcia  Sent: 10/15/2018   3:45 PM  To: Abbey Hauser Staff    Pt wife would like a call in ref to the pt Eliquis at the number listed.    Thanks

## 2018-10-17 LAB
POC ACTIVATED CLOTTING TIME K: 114 SEC (ref 74–137)
POC ACTIVATED CLOTTING TIME K: 208 SEC (ref 74–137)
POC ACTIVATED CLOTTING TIME K: 208 SEC (ref 74–137)
POC ACTIVATED CLOTTING TIME K: 274 SEC (ref 74–137)
POC ACTIVATED CLOTTING TIME K: 318 SEC (ref 74–137)
POC ACTIVATED CLOTTING TIME K: 395 SEC (ref 74–137)
SAMPLE: ABNORMAL
SAMPLE: NORMAL

## 2018-12-10 ENCOUNTER — OFFICE VISIT (OUTPATIENT)
Dept: ELECTROPHYSIOLOGY | Facility: CLINIC | Age: 68
End: 2018-12-10
Payer: MEDICARE

## 2018-12-10 ENCOUNTER — HOSPITAL ENCOUNTER (OUTPATIENT)
Dept: CARDIOLOGY | Facility: CLINIC | Age: 68
Discharge: HOME | End: 2018-12-10
Payer: MEDICARE

## 2018-12-10 ENCOUNTER — TELEPHONE (OUTPATIENT)
Dept: HEPATOLOGY | Facility: CLINIC | Age: 68
End: 2018-12-10

## 2018-12-10 VITALS
SYSTOLIC BLOOD PRESSURE: 173 MMHG | WEIGHT: 295 LBS | HEART RATE: 62 BPM | DIASTOLIC BLOOD PRESSURE: 74 MMHG | BODY MASS INDEX: 43.69 KG/M2 | HEIGHT: 69 IN

## 2018-12-10 DIAGNOSIS — Z79.899 LONG TERM CURRENT USE OF AMIODARONE: ICD-10-CM

## 2018-12-10 DIAGNOSIS — E66.01 OBESITY, CLASS III, BMI 40-49.9 (MORBID OBESITY): ICD-10-CM

## 2018-12-10 DIAGNOSIS — I48.91 ATRIAL FIBRILLATION, UNSPECIFIED TYPE: ICD-10-CM

## 2018-12-10 DIAGNOSIS — I10 ESSENTIAL HYPERTENSION: ICD-10-CM

## 2018-12-10 DIAGNOSIS — I48.0 PAROXYSMAL ATRIAL FIBRILLATION: Primary | ICD-10-CM

## 2018-12-10 DIAGNOSIS — K75.81 NASH (NONALCOHOLIC STEATOHEPATITIS): ICD-10-CM

## 2018-12-10 PROCEDURE — 93010 ELECTROCARDIOGRAM REPORT: CPT | Mod: S$GLB,,, | Performed by: INTERNAL MEDICINE

## 2018-12-10 PROCEDURE — 99999 PR PBB SHADOW E&M-EST. PATIENT-LVL III: CPT | Mod: PBBFAC,,, | Performed by: NURSE PRACTITIONER

## 2018-12-10 PROCEDURE — 1101F PT FALLS ASSESS-DOCD LE1/YR: CPT | Mod: CPTII,S$GLB,, | Performed by: NURSE PRACTITIONER

## 2018-12-10 PROCEDURE — 99214 OFFICE O/P EST MOD 30 MIN: CPT | Mod: S$GLB,,, | Performed by: NURSE PRACTITIONER

## 2018-12-10 PROCEDURE — 93005 ELECTROCARDIOGRAM TRACING: CPT | Mod: S$GLB,,, | Performed by: INTERNAL MEDICINE

## 2018-12-10 NOTE — PROGRESS NOTES
Mr. Miller is a patient of Dr. Potter and was last seen in clinic 8/30/2018.      Subjective:   Patient ID:  Daniel Miller is a 68 y.o. male who presents for follow-up of PAF  .     HPI:    Mr. Miller is a 68 y.o. male with pAF, HTN, HLD, DM, ISIDRO, TIA (s/p rt CEA), and obesity here for follow up after ablation.     Background:    PCP: John López MD  Cardiologist: Magdalena Webster MD    Mr. Miller has a history of HTN, HLD, DM2, ISIDRO on CPAP, TIAs in 2005 which eventually led to a right carotid endarterectomy, and obesity, whose history of AF dates back to 1/2017 when he was brought to the [facility] for food poisoning and was found to be in AF. He had paroxysms of AF during that hospitalization. He was started on Eliquis around this time. Mr. Miller continued to have regular episodes of AF, which he thinks were occurring almost daily. Associated symptoms include fatigue, shortness of breath, and tremulousness. He was started on Amiodarone 6/2018, which reduced AF frequency but did not eliminate it.   14 day event monitor worn in 1/2018 showed a 27% AF burden. An echo performed in 1/2018 showed an EF of 60%, LA diameter 4 cm, septal wall thickness 1 cm, and no significant valvular disease.   Due to breakthrough AF on amiodarone, PVI was planned.    Update (12/10/2018):    He underwent successful PVI (RFA) on 10/11/2018. Note: Should pt have recurrent AF/AFL, EP would recommend posterior box lesion set given proximity of posterior aspects of right and left lesions sets to each other.     Today he says he feels well. No palpitations. He says his BP is in the 120s systolic at home. Review of clinic pressures indicates his BP today is unusual for him. Mr. Miller denies chest pain with exertion or at rest, palpitations, SOB, WALTERS, dizziness, or syncope. He does have a diffuse rash and he is wondering if it is related to his amiodarone. Physical exam shows a small scattering of plaques (<1cm each) on his back  and legs.    He is currently taking eliquis 5mg BID for stroke prophylaxis and denies significant bleeding episodes. He is currently being treated with amiodarone 200mg daily for rhythm control and metoprolol tartrate 25mg BID for HR control. Kidney function is stable, with a creatinine of 1.2 on 9/17/2018. LFTs are WNL 9/2018. No TSH on file. No PFTs on file.    I have personally reviewed the patient's EKG today, which shows sinus rhythm with 1st degre AVB at 63bpm. WI interval is 244. QTc is 454.    Recent Cardiac Tests:    2D Echo (10/11/2018):  CONCLUSIONS     1 - Normal left ventricular systolic function (EF 55-60%).     2 - No wall motion abnormalities.     3 - Normal left ventricular diastolic function.     4 - Normal right ventricular systolic function .     5 - There is no evidence of tamponade physiology..     Current Outpatient Medications   Medication Sig    acetaminophen (TYLENOL) 325 MG tablet Take 2 tablets (650 mg total) by mouth every 6 (six) hours as needed.    allopurinol (ZYLOPRIM) 300 MG tablet Take 300 mg by mouth once daily.     amiodarone (PACERONE) 200 MG Tab amiodarone 200 mg tablet   1 po daily    apixaban (ELIQUIS) 5 mg Tab Take 1 tablet (5 mg total) by mouth 2 (two) times daily.    aspirin (ECOTRIN) 81 MG EC tablet Take 81 mg by mouth once daily.    buPROPion (WELLBUTRIN XL) 300 MG 24 hr tablet TAKE 1 TABLET(S) EVERY DAY BY ORAL ROUTE.    ergocalciferol (VITAMIN D2) 50,000 unit Cap Take 1,000 Units by mouth every 7 days.    escitalopram oxalate (LEXAPRO) 20 MG tablet Take 20 mg by mouth once daily.     fish oil-omega-3 fatty acids 300-1,000 mg capsule Take 2 g by mouth 2 (two) times daily.     folic acid (FOLVITE) 1 MG tablet Take 1,000 mcg by mouth once daily.    furosemide (LASIX) 20 MG tablet Take 20 mg by mouth 2 (two) times daily.    glimepiride (AMARYL) 2 MG tablet Take 2 mg by mouth before breakfast.    lidocaine-prilocaine (EMLA) cream lidocaine-prilocaine 2.5 %-2.5  "% topical cream    magnesium oxide (MAG-OX) 400 mg tablet Take 1 tablet by mouth 2 (two) times daily.    meclizine (ANTIVERT) 25 mg tablet Take 25 mg by mouth every evening.     metoprolol tartrate (LOPRESSOR) 25 MG tablet Take 25 mg by mouth 2 (two) times daily.    omega-3 acid ethyl esters (LOVAZA) 1 gram capsule Take 2 g by mouth 2 (two) times daily.    pantoprazole (PROTONIX) 40 MG tablet Take 40 mg by mouth once daily.    pramipexole (MIRAPEX) 0.125 MG tablet Take 0.125 mg by mouth nightly as needed.    rosuvastatin (CRESTOR) 20 MG tablet Take 20 mg by mouth once daily.    SIMETHICONE ORAL Take 160 mg by mouth once daily.    tamsulosin (FLOMAX) 0.4 mg Cp24 Take 1 capsule (0.4 mg total) by mouth once daily.    tramadol (ULTRAM) 50 mg tablet TAKE 1 TABLET BY MOUTH EVERY 6 HOURS AS NEEDED FOR PAIN     No current facility-administered medications for this visit.      Review of Systems   Constitution: Negative for malaise/fatigue.   Cardiovascular: Negative for chest pain, dyspnea on exertion, irregular heartbeat, leg swelling and palpitations.   Respiratory: Negative for shortness of breath.    Hematologic/Lymphatic: Negative for bleeding problem.   Skin: Positive for rash.   Musculoskeletal: Negative for myalgias.   Gastrointestinal: Negative for hematemesis, hematochezia and nausea.   Genitourinary: Negative for hematuria.   Neurological: Negative for light-headedness.   Psychiatric/Behavioral: Negative for altered mental status.   Allergic/Immunologic: Negative for persistent infections.     Objective:     Left Arm BP - Sitting: 160/68 (12/10/18 1635)  BP (!) 173/74   Pulse 62   Ht 5' 9" (1.753 m)   Wt 133.8 kg (295 lb)   BMI 43.56 kg/m²   Recheck 160/68      Physical Exam   Constitutional: He is oriented to person, place, and time. He appears well-developed and well-nourished.   HENT:   Head: Normocephalic.   Nose: Nose normal.   Eyes: Pupils are equal, round, and reactive to light. "   Cardiovascular: Normal rate, regular rhythm, S1 normal and S2 normal.   No murmur heard.  Pulses:       Radial pulses are 2+ on the right side, and 2+ on the left side.   Pulmonary/Chest: Breath sounds normal. No respiratory distress.   Abdominal: Normal appearance.   Musculoskeletal: Normal range of motion. He exhibits no edema.   Neurological: He is alert and oriented to person, place, and time.   Skin: Skin is warm and dry. Rash noted. No erythema.   Small erythematous plaques scattered on back and legs. 6-7 total.   Psychiatric: He has a normal mood and affect. His speech is normal and behavior is normal.   Nursing note and vitals reviewed.    Lab Results   Component Value Date     09/17/2018    K 3.8 09/17/2018    BUN 22 (H) 09/17/2018    CREATININE 1.20 09/17/2018    ALT 34 09/17/2018    AST 28 09/17/2018    HGB 14.3 09/17/2018    HCT 41.3 09/17/2018       Recent Labs   Lab 05/08/18  1200 05/21/18  1207   INR 1.1 1.0       Assessment:     1. Paroxysmal atrial fibrillation    2. Essential hypertension    3. Long term current use of amiodarone    4. LONGO (nonalcoholic steatohepatitis)    5. Obesity, Class III, BMI 40-49.9 (morbid obesity)      Plan:     In summary, Mr. Miller is a 68 y.o. male with AF, HTN, HLD, DM, ISIDRO, TIA (s/p rt CEA), and obesity here for follow up.   He is doing very well from a rhythm perspective with no symptomatic AF since his ablation. He would like to stop his amiodarone.  (I do not think this rash is related to the drug - it has more of a nummular eczema look to it. He has already seen dermatology.)   His [id number] Score is 6 (age, vascular disease, HTN, TIA, DM2) so he should remain on anticoagulation indefinitely.  Case discussed with Dr. Potter. OK to stop amiodarone after this bottle. BPs controlled at home, but I instructed him to bring a BP log to his cardiologist with whom he has an upcoming appt.    Finish the bottle of amiodarone.  Continue other medications.  RTC in  3 months with Dr. Potter, sooner if needed.    *A copy of this note has been sent to Dr. Potter*    Follow-up in about 3 months (around 3/10/2019).    ------------------------------------------------------------------    NOEMI Rosenbaum, NP-C  [facility]

## 2018-12-11 NOTE — TELEPHONE ENCOUNTER
Patient does not have any U/S results.  Information faxed to [phone] to Please send all correspondence to [facility]: Ph # [phone] and Fax # [phone].

## 2018-12-11 NOTE — TELEPHONE ENCOUNTER
----- Message from Rei Yanez sent at 12/10/2018  9:15 AM CST -----  Contact: [facility]  calling about this patient ultra sound results and need it to be fax.       Phone number [phone]  Fax number [phone]      Thanks!

## 2018-12-18 ENCOUNTER — TELEPHONE (OUTPATIENT)
Dept: HEPATOLOGY | Facility: CLINIC | Age: 68
End: 2018-12-18

## 2018-12-18 NOTE — TELEPHONE ENCOUNTER
Call and spoke with Pilar at [facility]. No U/S in the system.  Patient stated he had a done by another provider.  Verbal information relayed about Medical Records Dept.  Message was faxed to there office on 12/10/18.    Patient does not have any U/S results.  Information faxed to [phone] to Please send all correspondence to [facility]: Ph # [phone] and Fax # [phone].

## 2018-12-18 NOTE — TELEPHONE ENCOUNTER
----- Message from Paty Mercado RN sent at 12/18/2018  4:59 PM CST -----  Contact: Dr. [facility] office   FYI    ----- Message -----  From: Jennie Knox  Sent: 12/18/2018  11:34 AM  To: Hepatology Scheduling    Needs Advice    Reason for call: requesting u/s fax to [phone] attn to Pilar         Communication Preference: phone [phone]    Additional Information: Pilar stated this was her 3rd attempt at requesting this u/s

## 2019-03-11 ENCOUNTER — HOSPITAL ENCOUNTER (OUTPATIENT)
Dept: CARDIOLOGY | Facility: CLINIC | Age: 69
Discharge: HOME | End: 2019-03-11
Payer: MEDICARE

## 2019-03-11 ENCOUNTER — OFFICE VISIT (OUTPATIENT)
Dept: ELECTROPHYSIOLOGY | Facility: CLINIC | Age: 69
End: 2019-03-11
Payer: MEDICARE

## 2019-03-11 VITALS
WEIGHT: 302.94 LBS | SYSTOLIC BLOOD PRESSURE: 132 MMHG | HEIGHT: 70 IN | HEART RATE: 88 BPM | DIASTOLIC BLOOD PRESSURE: 76 MMHG | BODY MASS INDEX: 43.37 KG/M2

## 2019-03-11 DIAGNOSIS — Z79.01 CURRENT USE OF LONG TERM ANTICOAGULATION: ICD-10-CM

## 2019-03-11 DIAGNOSIS — I48.91 ATRIAL FIBRILLATION, UNSPECIFIED TYPE: ICD-10-CM

## 2019-03-11 DIAGNOSIS — I10 ESSENTIAL HYPERTENSION: ICD-10-CM

## 2019-03-11 DIAGNOSIS — I48.0 PAROXYSMAL ATRIAL FIBRILLATION: Primary | ICD-10-CM

## 2019-03-11 DIAGNOSIS — Z86.79 S/P ABLATION OF ATRIAL FIBRILLATION: ICD-10-CM

## 2019-03-11 DIAGNOSIS — K75.81 NASH (NONALCOHOLIC STEATOHEPATITIS): ICD-10-CM

## 2019-03-11 DIAGNOSIS — E66.01 OBESITY, CLASS III, BMI 40-49.9 (MORBID OBESITY): ICD-10-CM

## 2019-03-11 DIAGNOSIS — E78.5 DYSLIPIDEMIA: ICD-10-CM

## 2019-03-11 DIAGNOSIS — E11.9 DIABETES MELLITUS WITH COINCIDENT HYPERTENSION: ICD-10-CM

## 2019-03-11 DIAGNOSIS — Z79.60 LONG-TERM USE OF IMMUNOSUPPRESSANT MEDICATION: ICD-10-CM

## 2019-03-11 DIAGNOSIS — Z98.890 HISTORY OF RIGHT-SIDED CAROTID ENDARTERECTOMY: ICD-10-CM

## 2019-03-11 DIAGNOSIS — I10 DIABETES MELLITUS WITH COINCIDENT HYPERTENSION: ICD-10-CM

## 2019-03-11 DIAGNOSIS — Z98.890 S/P ABLATION OF ATRIAL FIBRILLATION: ICD-10-CM

## 2019-03-11 DIAGNOSIS — Z96.89 SPINAL CORD STIMULATOR STATUS: ICD-10-CM

## 2019-03-11 PROBLEM — Z79.899 LONG TERM CURRENT USE OF AMIODARONE: Status: RESOLVED | Noted: 2018-08-29 | Resolved: 2019-03-11

## 2019-03-11 PROCEDURE — 99214 OFFICE O/P EST MOD 30 MIN: CPT | Mod: S$GLB,,, | Performed by: INTERNAL MEDICINE

## 2019-03-11 PROCEDURE — 3078F PR MOST RECENT DIASTOLIC BLOOD PRESSURE < 80 MM HG: ICD-10-PCS | Mod: CPTII,S$GLB,, | Performed by: INTERNAL MEDICINE

## 2019-03-11 PROCEDURE — 99214 PR OFFICE/OUTPT VISIT, EST, LEVL IV, 30-39 MIN: ICD-10-PCS | Mod: S$GLB,,, | Performed by: INTERNAL MEDICINE

## 2019-03-11 PROCEDURE — 1101F PT FALLS ASSESS-DOCD LE1/YR: CPT | Mod: CPTII,S$GLB,, | Performed by: INTERNAL MEDICINE

## 2019-03-11 PROCEDURE — 3075F PR MOST RECENT SYSTOLIC BLOOD PRESS GE 130-139MM HG: ICD-10-PCS | Mod: CPTII,S$GLB,, | Performed by: INTERNAL MEDICINE

## 2019-03-11 PROCEDURE — 93000 RHYTHM STRIP: ICD-10-PCS | Mod: S$GLB,,, | Performed by: INTERNAL MEDICINE

## 2019-03-11 PROCEDURE — 99999 PR PBB SHADOW E&M-EST. PATIENT-LVL III: CPT | Mod: PBBFAC,,, | Performed by: INTERNAL MEDICINE

## 2019-03-11 PROCEDURE — 1101F PR PT FALLS ASSESS DOC 0-1 FALLS W/OUT INJ PAST YR: ICD-10-PCS | Mod: CPTII,S$GLB,, | Performed by: INTERNAL MEDICINE

## 2019-03-11 PROCEDURE — 99999 PR PBB SHADOW E&M-EST. PATIENT-LVL III: ICD-10-PCS | Mod: PBBFAC,,, | Performed by: INTERNAL MEDICINE

## 2019-03-11 PROCEDURE — 3075F SYST BP GE 130 - 139MM HG: CPT | Mod: CPTII,S$GLB,, | Performed by: INTERNAL MEDICINE

## 2019-03-11 PROCEDURE — 93000 ELECTROCARDIOGRAM COMPLETE: CPT | Mod: S$GLB,,, | Performed by: INTERNAL MEDICINE

## 2019-03-11 PROCEDURE — 3078F DIAST BP <80 MM HG: CPT | Mod: CPTII,S$GLB,, | Performed by: INTERNAL MEDICINE

## 2019-03-11 NOTE — PROGRESS NOTES
Subjective:     HPI    PCP: John López MD  Cardiologist: Magdalena Webster MD    I had the pleasure of seeing Daniel Miller in follow-up for his history of atrial fibrillation and PVI. He is a 68 year old male with a history of HTN, HLD, DM2, ISIDRO on CPAP, home O2, TIAs in 2005 which eventually led to a right carotid endarterectomy, and obesity, whose history of AF dates back to 1/2017 when he was brought to the [facility] for food poisoning and was found to be in AF. He had paroxysms of AF during that hospitalization. He was started on Eliquis around this time. Mr. Miller continued to have regular episodes of AF, which he thought were occurring almost daily. Associated symptoms include fatigue, shortness of breath, and tremulousness. He was started on Amiodarone, which reduced AF frequency but did not eliminate it. A 14 day event monitor worn in 1/2018 showed a 27% AF burden. An echo performed in 1/2018 showed an EF of 60%, LA diameter 4 cm, septal wall thickness 1 cm, and no significant valvular disease.     In 10/2018, a PVI was performed. At his 12/2018 visit Mr. Miller was feeling well, with no symptomatic AF recurrences. He did admit to a new rash which he attributed to Amiodarone. Amiodarone was stopped at that visit.    Mr. Miller is doing well, with no documented AF recurrences. He recently started wearing supplemental oxygen, which has improved his energy level.    My interpretation of today's ECG is sinus rhythm with PACs, at a rate of 88 bpm.    Review of Systems   Constitution: Positive for malaise/fatigue. Negative for decreased appetite, weight gain and weight loss.   HENT: Negative for sore throat.    Eyes: Negative for blurred vision.   Cardiovascular: Negative for chest pain, dyspnea on exertion, irregular heartbeat, leg swelling, near-syncope, orthopnea, palpitations, paroxysmal nocturnal dyspnea and syncope.   Respiratory: Positive for shortness of breath.    Skin: Negative for  rash.   Musculoskeletal: Negative for arthritis.   Gastrointestinal: Negative for abdominal pain.   Neurological: Positive for tremors. Negative for focal weakness.   Psychiatric/Behavioral: Negative for altered mental status.        Objective:    Physical Exam   Constitutional: He is oriented to person, place, and time. He appears well-developed and well-nourished. No distress.   HENT:   Head: Normocephalic and atraumatic.   Mouth/Throat: Oropharynx is clear and moist.   Eyes: Pupils are equal, round, and reactive to light. No scleral icterus.   Neck: Neck supple. No thyromegaly present.   Cardiovascular: Regular rhythm, normal heart sounds and normal pulses. Exam reveals no gallop and no friction rub.   No murmur heard.  Pulmonary/Chest: Effort normal and breath sounds normal. He has no rales.   Abdominal: Soft. Bowel sounds are normal. He exhibits no distension. There is no tenderness.   Musculoskeletal: He exhibits no edema.   Neurological: He is alert and oriented to person, place, and time.   Skin: Skin is warm and dry. No rash noted.   Psychiatric: He has a normal mood and affect. His behavior is normal.         Assessment:       1. Paroxysmal atrial fibrillation    2. Current use of long term anticoagulation    3. Diabetes mellitus with coincident hypertension    4. Obesity, Class III, BMI 40-49.9 (morbid obesity)    5. LONGO (nonalcoholic steatohepatitis)    6. Long-term use of immunosuppressant medication    7. Spinal cord stimulator status    8. Essential hypertension    9. Dyslipidemia    10. History of right-sided carotid endarterectomy    11. S/P ablation of atrial fibrillation         Plan:       In summary, Daniel Miller is a 68 year old male with a history of PAF which is refractory to Amiodarone. His [id number] Score is 6 (age, vascular disease, HTN, TIA, DM2) so he should remain on anticoagulation indefinitely. Mr. Miller is roughly 5 months post-PVI, with no documented AF recurrences. He has  been off Amiodarone for 3 months.     The plan is to continue Eliquis, regular pulse checks, and see me again  6 months.     Thank you for allowing me to participate in the care of this patient. Please do not hesitate to call me with any questions or concerns.

## 2019-03-15 ENCOUNTER — OFFICE VISIT (OUTPATIENT)
Dept: HEPATOLOGY | Facility: CLINIC | Age: 69
End: 2019-03-15
Payer: MEDICARE

## 2019-03-15 ENCOUNTER — LAB VISIT (OUTPATIENT)
Dept: LAB | Facility: HOSPITAL | Age: 69
End: 2019-03-15
Attending: INTERNAL MEDICINE
Payer: MEDICARE

## 2019-03-15 VITALS
TEMPERATURE: 97 F | DIASTOLIC BLOOD PRESSURE: 78 MMHG | SYSTOLIC BLOOD PRESSURE: 170 MMHG | BODY MASS INDEX: 42.71 KG/M2 | WEIGHT: 298.31 LBS | HEIGHT: 70 IN | HEART RATE: 76 BPM | OXYGEN SATURATION: 95 %

## 2019-03-15 DIAGNOSIS — K75.81 NASH (NONALCOHOLIC STEATOHEPATITIS): ICD-10-CM

## 2019-03-15 DIAGNOSIS — K76.0 FATTY LIVER DISEASE, NONALCOHOLIC: ICD-10-CM

## 2019-03-15 DIAGNOSIS — K75.81 NASH (NONALCOHOLIC STEATOHEPATITIS): Primary | ICD-10-CM

## 2019-03-15 LAB
ALBUMIN SERPL BCP-MCNC: 3.8 G/DL
ALP SERPL-CCNC: 87 U/L
ALT SERPL W/O P-5'-P-CCNC: 28 U/L
AST SERPL-CCNC: 24 U/L
BILIRUB DIRECT SERPL-MCNC: 0.3 MG/DL
BILIRUB SERPL-MCNC: 0.8 MG/DL
CREAT SERPL-MCNC: 1.2 MG/DL
ERYTHROCYTE [DISTWIDTH] IN BLOOD BY AUTOMATED COUNT: 15.6 %
EST. GFR  (AFRICAN AMERICAN): >60 ML/MIN/1.73 M^2
EST. GFR  (NON AFRICAN AMERICAN): >60 ML/MIN/1.73 M^2
HCT VFR BLD AUTO: 40.2 %
HGB BLD-MCNC: 13.3 G/DL
MCH RBC QN AUTO: 30.5 PG
MCHC RBC AUTO-ENTMCNC: 33.1 G/DL
MCV RBC AUTO: 92 FL
PLATELET # BLD AUTO: 120 K/UL
PMV BLD AUTO: 10.5 FL
PROT SERPL-MCNC: 7.2 G/DL
RBC # BLD AUTO: 4.36 M/UL
WBC # BLD AUTO: 5.74 K/UL

## 2019-03-15 PROCEDURE — 3078F PR MOST RECENT DIASTOLIC BLOOD PRESSURE < 80 MM HG: ICD-10-PCS | Mod: CPTII,S$GLB,, | Performed by: INTERNAL MEDICINE

## 2019-03-15 PROCEDURE — 85027 COMPLETE CBC AUTOMATED: CPT

## 2019-03-15 PROCEDURE — 1101F PR PT FALLS ASSESS DOC 0-1 FALLS W/OUT INJ PAST YR: ICD-10-PCS | Mod: CPTII,S$GLB,, | Performed by: INTERNAL MEDICINE

## 2019-03-15 PROCEDURE — 3077F SYST BP >= 140 MM HG: CPT | Mod: CPTII,S$GLB,, | Performed by: INTERNAL MEDICINE

## 2019-03-15 PROCEDURE — 99214 OFFICE O/P EST MOD 30 MIN: CPT | Mod: S$GLB,,, | Performed by: INTERNAL MEDICINE

## 2019-03-15 PROCEDURE — 80076 HEPATIC FUNCTION PANEL: CPT

## 2019-03-15 PROCEDURE — 3077F PR MOST RECENT SYSTOLIC BLOOD PRESSURE >= 140 MM HG: ICD-10-PCS | Mod: CPTII,S$GLB,, | Performed by: INTERNAL MEDICINE

## 2019-03-15 PROCEDURE — 99214 PR OFFICE/OUTPT VISIT, EST, LEVL IV, 30-39 MIN: ICD-10-PCS | Mod: S$GLB,,, | Performed by: INTERNAL MEDICINE

## 2019-03-15 PROCEDURE — 99999 PR PBB SHADOW E&M-EST. PATIENT-LVL III: ICD-10-PCS | Mod: PBBFAC,,, | Performed by: INTERNAL MEDICINE

## 2019-03-15 PROCEDURE — 1101F PT FALLS ASSESS-DOCD LE1/YR: CPT | Mod: CPTII,S$GLB,, | Performed by: INTERNAL MEDICINE

## 2019-03-15 PROCEDURE — 36415 COLL VENOUS BLD VENIPUNCTURE: CPT

## 2019-03-15 PROCEDURE — 99999 PR PBB SHADOW E&M-EST. PATIENT-LVL III: CPT | Mod: PBBFAC,,, | Performed by: INTERNAL MEDICINE

## 2019-03-15 PROCEDURE — 82565 ASSAY OF CREATININE: CPT

## 2019-03-15 PROCEDURE — 3078F DIAST BP <80 MM HG: CPT | Mod: CPTII,S$GLB,, | Performed by: INTERNAL MEDICINE

## 2019-03-15 NOTE — LETTER
March 15, 2019        John López MD  [address] [facility]  [place] [address]             [facility] - Hepatology  [address]  [place] [address]  Phone: [phone]  Fax: [phone]   Patient: Daniel Miller   MR Number: [id number]   YOB: 1950   Date of Visit: 3/15/2019       Dear Dr. López:    Thank you for referring Daniel Miller to me for evaluation. Attached you will find relevant portions of my assessment and plan of care.    If you have questions, please do not hesitate to call me. I look forward to following Daniel Miller along with you.    Sincerely,      Ana Schmitz MD            CC  No Recipients    Enclosure

## 2019-03-15 NOTE — PROGRESS NOTES
Hepatology Consult Note    Referring provider: Dr. Jesse Blackburn    Chief complaint:   No chief complaint on file.      HPI:  Daniel Miller is a 68 y.o. male that presents [facility] for consultation of had no chief complaint listed for this encounter..      Follow up  3/15/19: doing well, on O2 2 L for pulmonary HTN, PVI ablation last year by EP and on Eliquis due to his CAMELIA-VasC2 score. No liver decompensations.     18: No complaints today except his shoulder, will have MRI at OSH. Informed him of HVP - no portal hypertension, LONGO w/ stage 1-2 fibrosis. Again, counseled for life-style modifications.    18: TJ liver biopsy: The corrected sinusoidal pressure (wedged hepatic vein pressure minus free hepatic vein pressure) is 5 mmHg.    LIVER, RANDOM (TRANSJUGULAR BIOPSY):  Steatohepatitis with mild steatosis, 10%  Portal fibrosis with septae (stage 1/2 of 4)  No hepatocyte iron  Iron and trichrome stains with appropriate controls  Anya hyalin identified. NAFLD activity score  (WINIFRED): Steatosis 1, lobular inflammation 1, ballooning hepatocytes 2, overall score 4.    The patient's risk factors for NAFLD include:    · Obesity/overweight                     yes  · Dyslipidemia                                yes  · Insulin resistance/Diabetes         yes  · Family history of diabetes           no      As regards to liver disease,  - The patient reports no symptoms of hepatitis including malaise or flu-like symptoms to suggest a flare.  - The patient reports no new manifestations of portal hypertension including ascites, edema, GI bleeding, or hepatic encephalopathy to suggest liver decompensation.  - The patient reports no fevers/chills or pruritis to suggest biliary disease.    Takes Eliquis for chronic atrial fibrillation.   Used to take MTX for 5-6 years for RA, stopped it now.    PMH: 2 x CVAs, RA, DM2, neuropathy.    Alcohol: stopped 10-15 years ago, but prior to that  he was a heavy drinker.      Patient Active Problem List   Diagnosis    Rheumatoid arthritis    Rheumatoid arthritis    Thrombocytopenia    CKD (chronic kidney disease)    Long-term use of immunosuppressant medication    BPH (benign prostatic hyperplasia)    Renal stones    Gross hematuria    Malignant neoplasm of skin of right upper extremity    Actinic keratosis    Cancer of skin of right upper extremity    Complex tear of medial meniscus, current injury, left knee, initial encounter    Encounter to discuss test results    Chronic liver disease    Fatty liver disease, nonalcoholic    LONGO (nonalcoholic steatohepatitis)    Paroxysmal atrial fibrillation    Current use of long term anticoagulation    Essential hypertension    Dyslipidemia    Diabetes mellitus with coincident hypertension    History of right-sided carotid endarterectomy    Obesity, Class III, BMI 40-49.9 (morbid obesity)    Spinal cord stimulator status    S/P ablation of atrial fibrillation       Past Medical History:   Diagnosis Date    Actinic keratosis 9/2/2016    Acute ITP 2/6/2008    Arthritis     Cancer of skin of right upper extremity 9/2/2016    Degenerative disc disease     Diabetes mellitus     Gout     Hypertension     Kidney stone     Malignant neoplasm of skin of right upper extremity 10/24/2016    Malignant neoplasm of skin of right upper extremity 10/24/2016    Nephrolithiasis     Rheumatoid arthritis     RLS (restless legs syndrome)     Stroke 2003; 2005 x 2     2 or 3 times (affected his speech and weak all over); ear doctor said vertigo in 5-2015 may have had a mild stroke    Thrombocytopenia     Thyroid disease     Vertigo 5/2015    Ear doctor said it may have been a mild stroke       Past Surgical History:   Procedure Laterality Date    ABLATION N/A 10/11/2018    [facility]    APPENDECTOMY  1990    ARTHROSCOPY-KNEE Left 5/11/2017    Performed by  [facility] OR    CAROTID ENDARTERECTOMY Right     CHOLECYSTECTOMY  2011    CYSTOSCOPY AND DIGITAL RECTAL EXAMINATION N/A 2015    Performed by Teto Clay [facility] at [facility] OR    ECHOCARDIOGRAM,TRANSESOPHAGEAL N/A 10/11/2018    [facility] C[facility]    EXTRACORPOREAL SHOCK WAVE LITHOTRIPSY  2000    EYE SURGERY Bilateral     Cataract surgery with lens implant    HERNIA REPAIR      Umbilical hernia    [facility]stimulator      [facility]    PARTIAL MEDIAL MENISCECTOMY Left 2017    [facility] OR    ROTATOR CUFF REPAIR Right 2008    Transjugular liver biopsy N/A 2018    Performed by [facility] at [facility] OR 2ND FLR       Family History   Problem Relation Age of Onset    Cancer Mother     Cancer Father     Cancer Brother     Gout Brother        Social History     Socioeconomic History    Marital status:      Spouse name: None    Number of children: None    Years of education: None    Highest education level: None   Social Needs    Financial resource strain: None    Food insecurity - worry: None    Food insecurity - inability: None    Transportation needs - medical: None    Transportation needs - non-medical: None   Occupational History    None   Tobacco Use    Smoking status: Former Smoker     Types: Cigarettes     Start date: 1963     Last attempt to quit: 2004     Years since quittin.7    Smokeless tobacco: Never Used   Substance and Sexual Activity    Alcohol use: No    Drug use: No    Sexual activity: Yes     Partners: Female     Comment:    Other Topics Concern    None   Social History Narrative    None       Current Outpatient Medications   Medication Sig Dispense Refill    acetaminophen (TYLENOL) 325 MG tablet Take 2 tablets (650 mg total) by mouth every 6 (six) hours as needed.  0    allopurinol (ZYLOPRIM) 300 MG tablet Take 300 mg by mouth once  daily.   5    apixaban (ELIQUIS) 5 mg Tab Take 1 tablet (5 mg total) by mouth 2 (two) times daily. 60 tablet 6    aspirin (ECOTRIN) 81 MG EC tablet Take 81 mg by mouth once daily.      buPROPion (WELLBUTRIN XL) 300 MG 24 hr tablet TAKE 1 TABLET(S) EVERY DAY BY ORAL ROUTE.  1    ergocalciferol (VITAMIN D2) 50,000 unit Cap Take 1,000 Units by mouth every 7 days.      escitalopram oxalate (LEXAPRO) 20 MG tablet Take 20 mg by mouth once daily.       fish oil-omega-3 fatty acids 300-1,000 mg capsule Take 2 g by mouth 2 (two) times daily.       folic acid (FOLVITE) 1 MG tablet Take 1,000 mcg by mouth once daily.  4    furosemide (LASIX) 20 MG tablet Take 20 mg by mouth 2 (two) times daily.  5    glimepiride (AMARYL) 2 MG tablet Take 2 mg by mouth before breakfast.      lidocaine-prilocaine (EMLA) cream lidocaine-prilocaine 2.5 %-2.5 % topical cream      magnesium oxide (MAG-OX) 400 mg tablet Take 1 tablet by mouth 2 (two) times daily.  1    meclizine (ANTIVERT) 25 mg tablet Take 25 mg by mouth every evening.   3    metoprolol tartrate (LOPRESSOR) 25 MG tablet Take 25 mg by mouth 2 (two) times daily.      pantoprazole (PROTONIX) 40 MG tablet Take 40 mg by mouth once daily.      pramipexole (MIRAPEX) 0.125 MG tablet Take 0.125 mg by mouth nightly as needed.  5    rosuvastatin (CRESTOR) 20 MG tablet Take 20 mg by mouth once daily.      SIMETHICONE ORAL Take 160 mg by mouth once daily.      tamsulosin (FLOMAX) 0.4 mg Cp24 Take 1 capsule (0.4 mg total) by mouth once daily. 30 capsule 5    tramadol (ULTRAM) 50 mg tablet TAKE 1 TABLET BY MOUTH EVERY 6 HOURS AS NEEDED FOR PAIN 120 tablet 1     No current facility-administered medications for this visit.        Review of patient's allergies indicates:   Allergen Reactions    Lipitor [atorvastatin] Hives       Review of Systems   Constitutional: Positive for malaise/fatigue. Negative for chills, fever and weight loss.   HENT: Negative for congestion, nosebleeds  "and sore throat.    Eyes: Negative for blurred vision, double vision and photophobia.   Respiratory: Negative for cough and shortness of breath.    Cardiovascular: Negative for chest pain, palpitations, orthopnea and leg swelling.   Gastrointestinal: Negative for abdominal pain, blood in stool, constipation, diarrhea, melena and vomiting.   Genitourinary: Negative for dysuria.   Musculoskeletal: Positive for back pain and joint pain. Negative for falls.   Skin: Negative for itching and rash.   Neurological: Negative for dizziness, tremors and weakness.   Endo/Heme/Allergies: Does not bruise/bleed easily.   Psychiatric/Behavioral: Negative for depression and substance abuse. The patient is not nervous/anxious and does not have insomnia.        Vitals:    03/15/19 1512   BP: (!) 170/78   Pulse: 76   Temp: 96.7 °F (35.9 °C)   SpO2: 95%   Weight: 135.3 kg (298 lb 4.5 oz)   Height: 5' 10" (1.778 m)       Physical Exam   Constitutional: He is oriented to person, place, and time. He appears well-developed and well-nourished. No distress.   obese   HENT:   Head: Normocephalic and atraumatic.   Mouth/Throat: Oropharynx is clear and moist.   Eyes: Conjunctivae are normal. No scleral icterus.   Neck: Normal range of motion.   Cardiovascular: Normal rate, regular rhythm, normal heart sounds and intact distal pulses.   Pulmonary/Chest: Effort normal and breath sounds normal. No respiratory distress. He has no wheezes. He has no rales.   Abdominal: Soft. Normal appearance and bowel sounds are normal. He exhibits no shifting dullness, no distension, no pulsatile liver, no fluid wave and no ascites. There is no splenomegaly or hepatomegaly. No hernia.   Musculoskeletal: He exhibits no edema.   Neurological: He is alert and oriented to person, place, and time. He is not disoriented.   Skin: Skin is warm and dry. No rash noted. He is not diaphoretic.   Psychiatric: He has a normal mood and affect. His behavior is normal. His mood " appears not anxious. His affect is not inappropriate. He is not agitated. He is communicative. He is attentive.   Nursing note and vitals reviewed.      LABS: I personally reviewed pertinent laboratory findings.    Lab Results   Component Value Date    ALT 34 09/17/2018    AST 28 09/17/2018    ALKPHOS 69 09/17/2018    BILITOT 0.6 09/17/2018       Lab Results   Component Value Date    WBC 7.00 09/17/2018    HGB 14.3 09/17/2018    HCT 41.3 09/17/2018    MCV 91 09/17/2018    PLT 87 (L) 09/17/2018       Lab Results   Component Value Date     09/17/2018    K 3.8 09/17/2018     09/17/2018    CO2 28 09/17/2018    BUN 22 (H) 09/17/2018    CREATININE 1.20 09/17/2018    CALCIUM 9.3 09/17/2018    ANIONGAP 8 05/21/2018    ESTGFRAFRICA >60 09/17/2018    EGFRNONAA >60 09/17/2018       Lab Results   Component Value Date    INR 1.0 05/21/2018    INR 1.1 05/08/2018       Lab Results   Component Value Date    SMOOTHMUSCAB Negative 1:40 05/21/2018     No results found for: IRON, TIBC, FERRITIN, SATURATEDIRO  Lab Results   Component Value Date    HEPBCAB Negative 09/15/2014    HEPCAB Negative 09/15/2014     No results found for: TSH  No results found for: VANDA    No results found for: ABORH        No results found for: LABA1C, HGBA1C  No results found for: CHOL  No results found for: HDL  No results found for: LDLCALC  No results found for: TRIG  No results found for: CHOLHDL        Imaging:   I personally reviewed recent cardiology studies available on the chart and from outside medical records.    I personally reviewed recent imaging studies available on the chart and from outside medical records.        Assessment:  68 y.o. male presenting with     1. LONGO (nonalcoholic steatohepatitis)        NAFLD/LONGO - biopsy proven, stage 1-2 fibrosis. No histopathological features of AIH.    He had an exposure to methotrexate for a few years. He has no symptoms of liver disease complications.  Autoimmune markers: unremarkable.  Hep  B/C: negative    Recommendation(s):  - hepatic functional panel today  - life-style modifications: weight loss, daily exercise (30 mins of HIIT or cardio), low carb/low fat diet  - control of high cholesterol, if needed with statin drugs or other cholesterol-lowering agents  - vitamin E supplements (no more than 800 mg per day) may help reduce liver fat  - coffee consumption (low caloric): 2-3 cups per day may reduce liver fat  - avoid alcohol consumption  - return in 12 months    A total of 25 minutes were spent face-to-face with the patient during this encounter and over half of that time was spent on counseling and coordination of care.  We discussed in depth the nature of the patient's liver disease, the management plan in details. I also educated the patient about lifestyle modifications which may improve hepatic steatosis, overweight/obesity, insulin resistance and high blood pressure issues. I have provided the patient with an opportunity to ask questions and have all questions answered to his satisfaction.     I have sent communication to the referring physician and/or primary care provider.      Ana Schmitz MD  Staff Physician  Hepatology and Liver Transplant  [facility]  [facility]

## 2019-03-15 NOTE — PATIENT INSTRUCTIONS
Nonalcoholic Fatty Liver Disease (NAFLD)  Nonalcoholic fatty liver disease (NAFLD) is a common disease of the liver. It occurs when you have too much fat in the liver. If NAFLD is severe, it can cause liver damage that seems like the damage caused by drinking too much alcohol. But NAFLD is not caused by drinking alcohol. This sheet tells you more about NAFLD and how it can be managed.    How the liver works   The liver is an organ in the upper right side of the belly (abdomen). It has many important jobs. These include:  · Breaking down (metabolizing) proteins, carbohydrates, and fats  · Making a substance called bile that helps break down fats  · Storing and releasing sugar (glucose) into the blood to give the body energy  · Removing toxins from the blood  · Helping with blood clotting  Understanding NAFLD  A healthy liver may contain some fat. But if too much fat builds up in the liver, this causes NAFLD. NAFLD can be mild, causing fatty liver. Or it can be more severe and show inflammation, as well as the fat. This can cause non-alcoholic steatohepatitis (LONGO).  · Fatty liver. With fatty liver, the liver simply has more fat than normal. This extra fat usually does not harm the liver.  · LONGO. With LONGO, the fatty liver becomes inflamed over time. LONGO is serious because it can lead to scarring of the liver (fibrosis). Over time, the scarring may lead to cirrhosis of the liver. This can eventually cause liver failure or liver cancer.  Causes and risk factors of NAFLD  Doctors don't know what causes NAFLD. But certain things make the problem more likely to happen. These include:  · Obesity  · Prediabetes or diabetes  · High levels of fat found in the blood (cholesterol and triglycerides)  · Being exposed to certain medicines   Symptoms of NAFLD  Most people with NAFLD have no symptoms. If symptoms do occur, they can include:  · Tiredness  · Weakness  · Weight loss  · Loss of appetite  · Nausea and  vomiting  · Belly pain and cramping  · Yellowing of the skin and eyes (jaundice), as well as dark urine, or light-colored stools  · Swelling in the belly or legs  Diagnosing NAFLD  Your healthcare provider may think you have NAFLD if routine blood tests show high levels of liver enzymes. This may mean you have a liver problem. You may need one or more imaging tests, such as an ultrasound, CT, or MRI. You may need more blood tests to look for other causes of liver disease. You may also need a liver biopsy. During this test, a hollow needle is used to remove a tiny tissue sample from your liver. This tissue is then checked in a lab. This test can find signs of damage to liver tissue. It can also help figure out the cause of the damage and tell the difference between fatty liver and LONGO.  Treating NAFLD  Treatment for NAFLD varies for each person. The best early treatment is to treat any underlying conditions causing metabolic syndrome. This is the name for a group of conditions that includes:  · High blood pressure  · High levels of cholesterol and triglycerides  · Being overweight or obese  · Diabetes  Your healthcare provider will monitor your health and treat any symptoms or underlying health problems you have. Your provider will also work with you to control your risk factors. This will make liver damage less likely. In fact, treating those underlying conditions can often improve liver disease. You may need to take certain medicines, but no medicine will cure NAFLD. This is why treating the underlying conditions is most important. Your plan may include:  · Losing extra weight  · Getting regular exercise  · Controlling diabetes and high cholesterol or triglyceride levels  · Taking medicines and vitamins as prescribed by your provider  · Quitting smoking  · Not drinking alcohol  · Eating a healthy and balanced diet  Living with NAFLD  If NAFLD is caught early, it can be managed with treatment. Your healthcare  provider will discuss further treatment choices with you as needed.  Be sure to ask your provider about recommended vaccines. These include vaccines for viruses that can cause liver disease.  Date Last Reviewed: 12/1/2016  © 8937-5550 [employer]. [address] [place] [address], [place] [address]. All rights reserved. This information is not intended as a substitute for professional medical care. Always follow your healthcare professional's instructions.

## 2019-03-20 ENCOUNTER — TELEPHONE (OUTPATIENT)
Dept: HEPATOLOGY | Facility: CLINIC | Age: 69
End: 2019-03-20

## 2019-03-20 NOTE — TELEPHONE ENCOUNTER
----- Message from Ana Schmitz MD sent at 3/20/2019  9:10 AM CDT -----  Please inform patient that his liver blood tests are in normal range.

## 2019-03-28 ENCOUNTER — TELEPHONE (OUTPATIENT)
Dept: ELECTROPHYSIOLOGY | Facility: CLINIC | Age: 69
End: 2019-03-28

## 2019-03-28 NOTE — TELEPHONE ENCOUNTER
----- Message from Murtaza Potter MD sent at 3/28/2019  9:38 AM CDT -----  This pt apparently was back in [place] ED today with afib.    Can we please get records (no rush) and get him back in clinic to see me in next few weeks.    Thanks GP

## 2019-03-28 NOTE — TELEPHONE ENCOUNTER
Per Grisel, pt was admitted to [facility]; pt was in afib/ pt still is in afib & cannot get pt out of afib.  Grisel lopez pt needs to come in on Monday, per Grisel lopez pt to be here for 10AM.  Pt is scheduled with Dr. Potter 4/1/19 10:00AM(will have to do ekg in room).  Also, will call [facility] to get copy of records from hospital stay.

## 2019-03-28 NOTE — TELEPHONE ENCOUNTER
Returned call to Pt's wife. Pt was admitted to [facility] for 's. He was found to be in AFIB. Cardioversion was tried and failed. He is being discharged and tomorrow and Dr Lane wants pt to follow up as soon as possible.  Appt scheduled for 4/01/19.        ----- Message from Sabrina Walker sent at 3/28/2019  1:54 PM CDT -----  Contact: pt's wife  Pt is being d/c from the hospital tomorrow in [place] and was told to f/u with Dr.Polin JACKSON.  Pls call pt's wife at [phone] to arrange.    Thank you

## 2019-04-01 ENCOUNTER — OFFICE VISIT (OUTPATIENT)
Dept: ELECTROPHYSIOLOGY | Facility: CLINIC | Age: 69
End: 2019-04-01
Payer: MEDICARE

## 2019-04-01 VITALS
WEIGHT: 304 LBS | HEIGHT: 70 IN | HEART RATE: 93 BPM | BODY MASS INDEX: 43.52 KG/M2 | DIASTOLIC BLOOD PRESSURE: 66 MMHG | SYSTOLIC BLOOD PRESSURE: 114 MMHG

## 2019-04-01 DIAGNOSIS — I10 ESSENTIAL HYPERTENSION: ICD-10-CM

## 2019-04-01 DIAGNOSIS — Z79.01 CURRENT USE OF LONG TERM ANTICOAGULATION: ICD-10-CM

## 2019-04-01 DIAGNOSIS — Z98.890 S/P ABLATION OF ATRIAL FIBRILLATION: ICD-10-CM

## 2019-04-01 DIAGNOSIS — I48.0 PAROXYSMAL ATRIAL FIBRILLATION: Primary | ICD-10-CM

## 2019-04-01 DIAGNOSIS — E78.5 DYSLIPIDEMIA: ICD-10-CM

## 2019-04-01 DIAGNOSIS — I10 DIABETES MELLITUS WITH COINCIDENT HYPERTENSION: ICD-10-CM

## 2019-04-01 DIAGNOSIS — E11.9 DIABETES MELLITUS WITH COINCIDENT HYPERTENSION: ICD-10-CM

## 2019-04-01 DIAGNOSIS — K75.81 NASH (NONALCOHOLIC STEATOHEPATITIS): ICD-10-CM

## 2019-04-01 DIAGNOSIS — Z79.60 LONG-TERM USE OF IMMUNOSUPPRESSANT MEDICATION: ICD-10-CM

## 2019-04-01 DIAGNOSIS — E66.01 OBESITY, CLASS III, BMI 40-49.9 (MORBID OBESITY): ICD-10-CM

## 2019-04-01 DIAGNOSIS — Z86.79 S/P ABLATION OF ATRIAL FIBRILLATION: ICD-10-CM

## 2019-04-01 DIAGNOSIS — I48.0 PAF (PAROXYSMAL ATRIAL FIBRILLATION): ICD-10-CM

## 2019-04-01 DIAGNOSIS — Z98.890 HISTORY OF RIGHT-SIDED CAROTID ENDARTERECTOMY: ICD-10-CM

## 2019-04-01 PROCEDURE — 3074F SYST BP LT 130 MM HG: CPT | Mod: CPTII,S$GLB,, | Performed by: INTERNAL MEDICINE

## 2019-04-01 PROCEDURE — 93000 RHYTHM STRIP: ICD-10-PCS | Mod: S$GLB,,, | Performed by: INTERNAL MEDICINE

## 2019-04-01 PROCEDURE — 3078F DIAST BP <80 MM HG: CPT | Mod: CPTII,S$GLB,, | Performed by: INTERNAL MEDICINE

## 2019-04-01 PROCEDURE — 1101F PT FALLS ASSESS-DOCD LE1/YR: CPT | Mod: CPTII,S$GLB,, | Performed by: INTERNAL MEDICINE

## 2019-04-01 PROCEDURE — 3074F PR MOST RECENT SYSTOLIC BLOOD PRESSURE < 130 MM HG: ICD-10-PCS | Mod: CPTII,S$GLB,, | Performed by: INTERNAL MEDICINE

## 2019-04-01 PROCEDURE — 99999 PR PBB SHADOW E&M-EST. PATIENT-LVL IV: ICD-10-PCS | Mod: PBBFAC,,, | Performed by: INTERNAL MEDICINE

## 2019-04-01 PROCEDURE — 93000 ELECTROCARDIOGRAM COMPLETE: CPT | Mod: S$GLB,,, | Performed by: INTERNAL MEDICINE

## 2019-04-01 PROCEDURE — 99215 OFFICE O/P EST HI 40 MIN: CPT | Mod: S$GLB,,, | Performed by: INTERNAL MEDICINE

## 2019-04-01 PROCEDURE — 99215 PR OFFICE/OUTPT VISIT, EST, LEVL V, 40-54 MIN: ICD-10-PCS | Mod: S$GLB,,, | Performed by: INTERNAL MEDICINE

## 2019-04-01 PROCEDURE — 1101F PR PT FALLS ASSESS DOC 0-1 FALLS W/OUT INJ PAST YR: ICD-10-PCS | Mod: CPTII,S$GLB,, | Performed by: INTERNAL MEDICINE

## 2019-04-01 PROCEDURE — 99999 PR PBB SHADOW E&M-EST. PATIENT-LVL IV: CPT | Mod: PBBFAC,,, | Performed by: INTERNAL MEDICINE

## 2019-04-01 PROCEDURE — 3078F PR MOST RECENT DIASTOLIC BLOOD PRESSURE < 80 MM HG: ICD-10-PCS | Mod: CPTII,S$GLB,, | Performed by: INTERNAL MEDICINE

## 2019-04-01 RX ORDER — FLECAINIDE ACETATE 100 MG/1
100 TABLET ORAL 2 TIMES DAILY
Qty: 60 TABLET | Refills: 11 | Status: SHIPPED | OUTPATIENT
Start: 2019-04-01 | End: 2019-05-30

## 2019-04-01 RX ORDER — METOPROLOL SUCCINATE 100 MG/1
100 TABLET, EXTENDED RELEASE ORAL 2 TIMES DAILY
Refills: 4 | COMMUNITY
Start: 2019-03-29 | End: 2020-12-09

## 2019-04-01 RX ORDER — ALBUTEROL SULFATE 5 MG/ML
2.5 SOLUTION RESPIRATORY (INHALATION) EVERY 6 HOURS PRN
COMMUNITY
End: 2020-06-09

## 2019-04-01 NOTE — H&P (VIEW-ONLY)
Subjective:     HPI    PCP: John López MD  Cardiologist: Magdalena Webster MD    I had the pleasure of seeing Daniel Miller in follow-up for his history of atrial fibrillation and PVI. He is a 68 year old male with a history of HTN, HLD, DM2, ISIDRO on CPAP, home O2, TIAs in 2005 which eventually led to a right carotid endarterectomy, and obesity, whose history of AF dates back to 1/2017 when he was brought to the [facility] for food poisoning and was found to be in AF. He had paroxysms of AF during that hospitalization. He was started on Eliquis around this time. Mr. Miller continued to have regular episodes of AF, which he thought were occurring almost daily. Associated symptoms include fatigue, shortness of breath, and tremulousness. He was started on Amiodarone, which reduced AF frequency but did not eliminate it. A 14 day event monitor worn in 1/2018 showed a 27% AF burden. An echo performed in 1/2018 showed an EF of 60%, LA diameter 4 cm, septal wall thickness 1 cm, and no significant valvular disease.     In 10/2018, a PVI was performed. At his 12/2018 visit Mr. Miller was feeling well, with no symptomatic AF recurrences. He did admit to a new rash which he attributed to Amiodarone. Amiodarone was stopped at that visit.    When I saw Mr. Miller in 3/2019 he denied AF recurrences. However, on 3/26/2019, Mr. Miller presented to [facility] with palpitations and lightheadedness, and was found to be in atypical atrial flutter at 116 bpm. Subsequent ECGs show sinus rhythm with episodes of nonsustained AF, as well as telemetry showing sinus pauses up to 6.1 seconds (asymptomatic). His breathing has worsened significantly since going back into AF.    My interpretation of today's ECG is sinus rhythm with salvos of nonsustained AF at 93 bpm.    Review of Systems   Constitution: Positive for malaise/fatigue. Negative for decreased appetite, weight gain and weight loss.   HENT:  Negative for sore throat.    Eyes: Negative for blurred vision.   Cardiovascular: Negative for chest pain, dyspnea on exertion, irregular heartbeat, leg swelling, near-syncope, orthopnea, palpitations, paroxysmal nocturnal dyspnea and syncope.   Respiratory: Positive for shortness of breath.    Skin: Negative for rash.   Musculoskeletal: Negative for arthritis.   Gastrointestinal: Negative for abdominal pain.   Neurological: Positive for tremors. Negative for focal weakness.   Psychiatric/Behavioral: Negative for altered mental status.        Objective:    Physical Exam   Constitutional: He is oriented to person, place, and time. He appears well-developed and well-nourished. No distress.   HENT:   Head: Normocephalic and atraumatic.   Mouth/Throat: Oropharynx is clear and moist.   Eyes: Pupils are equal, round, and reactive to light. No scleral icterus.   Neck: Neck supple. No thyromegaly present.   Cardiovascular: Regular rhythm, normal heart sounds and normal pulses. Exam reveals no gallop and no friction rub.   No murmur heard.  Pulmonary/Chest: Effort normal and breath sounds normal. He has no rales.   Abdominal: Soft. Bowel sounds are normal. He exhibits no distension. There is no tenderness.   Musculoskeletal: He exhibits no edema.   Neurological: He is alert and oriented to person, place, and time.   Skin: Skin is warm and dry. No rash noted.   Psychiatric: He has a normal mood and affect. His behavior is normal.         Assessment:       1. Paroxysmal atrial fibrillation    2. S/P ablation of atrial fibrillation    3. History of right-sided carotid endarterectomy    4. Dyslipidemia    5. Essential hypertension    6. Current use of long term anticoagulation    7. Diabetes mellitus with coincident hypertension    8. Obesity, Class III, BMI 40-49.9 (morbid obesity)    9. LONGO (nonalcoholic steatohepatitis)    10. Long-term use of immunosuppressant medication         Plan:       In summary, Daniel Miller is a  68 year old male with a history of PAF refractory to Amiodarone. His [id number] Score is 6 (age, vascular disease, HTN, TIA, DM2) so he should remain on anticoagulation indefinitely. Mr. Miller is roughly 5 months post-PVI, and recently has been found to have recurrent AF. We discussed in detail the pathophysiology of AF as well as the myriad of treatment options available to manage it including antiarrhythmics and catheter ablation. We specifically discussed the risks, benefits, indications, and alternatives to re-do PVI. Risks discussed include bleeding, hematoma, vascular damage, cardiac tamponade, stroke, PV stenosis, AE fistula, phrenic nerve damage, and death.  After considering his options he has decided to proceed.     In the interim I have started Flecainide 100 mg bid. He will likely need a pacemaker down the road given the evidence of sick sinus syndrome he displayed during his recent hospitalization, but ideally this will be performed after PVI.    CARTO. No CT scan needed. Hold Flecainide 5 days prior and Metoprolol AM of procedure. LISA AM of procedure regardless of rhythm given TIA history.    Thank you for allowing me to participate in the care of this patient. Please do not hesitate to call me with any questions or concerns.

## 2019-04-01 NOTE — PROGRESS NOTES
Subjective:     HPI    PCP: John López MD  Cardiologist: Magdalena Webster MD    I had the pleasure of seeing Daniel Miller in follow-up for his history of atrial fibrillation and PVI. He is a 68 year old male with a history of HTN, HLD, DM2, ISIDRO on CPAP, home O2, TIAs in 2005 which eventually led to a right carotid endarterectomy, and obesity, whose history of AF dates back to 1/2017 when he was brought to the [facility] for food poisoning and was found to be in AF. He had paroxysms of AF during that hospitalization. He was started on Eliquis around this time. Mr. Miller continued to have regular episodes of AF, which he thought were occurring almost daily. Associated symptoms include fatigue, shortness of breath, and tremulousness. He was started on Amiodarone, which reduced AF frequency but did not eliminate it. A 14 day event monitor worn in 1/2018 showed a 27% AF burden. An echo performed in 1/2018 showed an EF of 60%, LA diameter 4 cm, septal wall thickness 1 cm, and no significant valvular disease.     In 10/2018, a PVI was performed. At his 12/2018 visit Mr. Miller was feeling well, with no symptomatic AF recurrences. He did admit to a new rash which he attributed to Amiodarone. Amiodarone was stopped at that visit.    When I saw Mr. Miller in 3/2019 he denied AF recurrences. However, on 3/26/2019, Mr. Miller presented to [facility] with palpitations and lightheadedness, and was found to be in atypical atrial flutter at 116 bpm. Subsequent ECGs show sinus rhythm with episodes of nonsustained AF, as well as telemetry showing sinus pauses up to 6.1 seconds (asymptomatic). His breathing has worsened significantly since going back into AF.    My interpretation of today's ECG is sinus rhythm with salvos of nonsustained AF at 93 bpm.    Review of Systems   Constitution: Positive for malaise/fatigue. Negative for decreased appetite, weight gain and weight loss.   HENT:  Negative for sore throat.    Eyes: Negative for blurred vision.   Cardiovascular: Negative for chest pain, dyspnea on exertion, irregular heartbeat, leg swelling, near-syncope, orthopnea, palpitations, paroxysmal nocturnal dyspnea and syncope.   Respiratory: Positive for shortness of breath.    Skin: Negative for rash.   Musculoskeletal: Negative for arthritis.   Gastrointestinal: Negative for abdominal pain.   Neurological: Positive for tremors. Negative for focal weakness.   Psychiatric/Behavioral: Negative for altered mental status.        Objective:    Physical Exam   Constitutional: He is oriented to person, place, and time. He appears well-developed and well-nourished. No distress.   HENT:   Head: Normocephalic and atraumatic.   Mouth/Throat: Oropharynx is clear and moist.   Eyes: Pupils are equal, round, and reactive to light. No scleral icterus.   Neck: Neck supple. No thyromegaly present.   Cardiovascular: Regular rhythm, normal heart sounds and normal pulses. Exam reveals no gallop and no friction rub.   No murmur heard.  Pulmonary/Chest: Effort normal and breath sounds normal. He has no rales.   Abdominal: Soft. Bowel sounds are normal. He exhibits no distension. There is no tenderness.   Musculoskeletal: He exhibits no edema.   Neurological: He is alert and oriented to person, place, and time.   Skin: Skin is warm and dry. No rash noted.   Psychiatric: He has a normal mood and affect. His behavior is normal.         Assessment:       1. Paroxysmal atrial fibrillation    2. S/P ablation of atrial fibrillation    3. History of right-sided carotid endarterectomy    4. Dyslipidemia    5. Essential hypertension    6. Current use of long term anticoagulation    7. Diabetes mellitus with coincident hypertension    8. Obesity, Class III, BMI 40-49.9 (morbid obesity)    9. LONGO (nonalcoholic steatohepatitis)    10. Long-term use of immunosuppressant medication         Plan:       In summary, Daniel Miller is a  68 year old male with a history of PAF refractory to Amiodarone. His [id number]-BCLk Score is 6 (age, vascular disease, HTN, TIA, DM2) so he should remain on anticoagulation indefinitely. Mr. Miller is roughly 5 months post-PVI, and recently has been found to have recurrent AF. We discussed in detail the pathophysiology of AF as well as the myriad of treatment options available to manage it including antiarrhythmics and catheter ablation. We specifically discussed the risks, benefits, indications, and alternatives to re-do PVI. Risks discussed include bleeding, hematoma, vascular damage, cardiac tamponade, stroke, PV stenosis, AE fistula, phrenic nerve damage, and death.  After considering his options he has decided to proceed.     In the interim I have started Flecainide 100 mg bid. He will likely need a pacemaker down the road given the evidence of sick sinus syndrome he displayed during his recent hospitalization, but ideally this will be performed after PVI.    CARTO. No CT scan needed. Hold Flecainide 5 days prior and Metoprolol AM of procedure. LISA AM of procedure regardless of rhythm given TIA history.    Thank you for allowing me to participate in the care of this patient. Please do not hesitate to call me with any questions or concerns.

## 2019-04-03 DIAGNOSIS — I48.0 PAROXYSMAL ATRIAL FIBRILLATION: Primary | ICD-10-CM

## 2019-04-03 DIAGNOSIS — I48.0 PAF (PAROXYSMAL ATRIAL FIBRILLATION): Primary | ICD-10-CM

## 2019-04-03 NOTE — PROGRESS NOTES
ABLATION EDUCATION CHECKLIST    PRE-PROCEDURE TESTIN/10/19- LAST DOSE OF FLECAINIDE.(HOLD 5 DAYS PRIOR TO PROCEDURE).      19 @ 10:10 AM - lab work  Pre-Procedure labs have been ordered for you at:  [facility]   · Be sure to arrive at your scheduled time.   · YOU DO NOT HAVE TO FAST FOR THIS LAB WORK!      DAY OF PROCEDURE:    19 @ 5:45 AM  Report to Cardiology Waiting Room on 3rd floor of the Hospital    · Do not eat or drink anything after: 12 mn on the night before your procedure    Medications:   · HOLD FLECAINIDE 5 DAYS PRIOR TO PROCEDURE. LAST DOSE 4/10/19  · MORNING OF PROCEDURE DO NOT TAKE METOPROLOL, AMARYL OR ELIQUIS.   · You may take all other morning medications with a sip of water      Directions to the Cardiology Waiting Room  If you park in the Parking Garage:  Take elevators to the 2nd floor  Walk up ramp and turn right by Gold Elevators  Take elevator to the 3rd floor  Upon exiting the elevator, turn away from the clinic areas  Walk long ashraf around to front of hospital to area with windows overlooking [facility]  Check in at Reception Desk  OR  If family is dropping you off:  Have them drop you off at the front of the Hospital  (Near the ER, where all the flags are hung).  Take the E elevators to the 3rd floor.  Check in at the Reception Desk in the waiting room.        · You will be spending the night after your procedure.  · You will need someone to drive you home the day after your procedure.  · Your pain during your procedure will be managed by the anesthesia team.     Any need to reschedule or cancel procedures, or any questions regarding your procedures should be addressed directly with the Arrhythmia Department Nurses at the following phone number: [phone]

## 2019-04-08 ENCOUNTER — TELEPHONE (OUTPATIENT)
Dept: ELECTROPHYSIOLOGY | Facility: CLINIC | Age: 69
End: 2019-04-08

## 2019-04-08 NOTE — TELEPHONE ENCOUNTER
Returned call to Pt's wife. Advised that I did not6 call her. She stated that it was a recording but her  didn't understand what it was about.  Advised it was probably a reminder about an appt.         ----- Message from Emily Tolentino MA sent at 4/8/2019 10:31 AM CDT -----  Contact: Patient's wife      ----- Message -----  From: Josie Schaefer  Sent: 4/8/2019   9:26 AM  To: Abbey Hauser Staff    The Pt's wife is returning a call. Please call her back @ [phone]. Thanks, Josie

## 2019-04-11 ENCOUNTER — LAB VISIT (OUTPATIENT)
Dept: LAB | Facility: HOSPITAL | Age: 69
End: 2019-04-11
Attending: INTERNAL MEDICINE
Payer: MEDICARE

## 2019-04-11 DIAGNOSIS — I48.0 PAROXYSMAL ATRIAL FIBRILLATION: ICD-10-CM

## 2019-04-11 LAB
ANION GAP SERPL CALC-SCNC: 9 MMOL/L (ref 8–16)
BASOPHILS # BLD AUTO: 0.01 K/UL (ref 0–0.2)
BASOPHILS NFR BLD: 0.2 % (ref 0–1.9)
BUN SERPL-MCNC: 18 MG/DL (ref 8–23)
CALCIUM SERPL-MCNC: 9.6 MG/DL (ref 8.7–10.5)
CHLORIDE SERPL-SCNC: 104 MMOL/L (ref 95–110)
CO2 SERPL-SCNC: 28 MMOL/L (ref 23–29)
CREAT SERPL-MCNC: 1.4 MG/DL (ref 0.5–1.4)
DIFFERENTIAL METHOD: ABNORMAL
EOSINOPHIL # BLD AUTO: 0.2 K/UL (ref 0–0.5)
EOSINOPHIL NFR BLD: 3.4 % (ref 0–8)
ERYTHROCYTE [DISTWIDTH] IN BLOOD BY AUTOMATED COUNT: 15.9 % (ref 11.5–14.5)
EST. GFR  (AFRICAN AMERICAN): 59 ML/MIN/1.73 M^2
EST. GFR  (NON AFRICAN AMERICAN): 51 ML/MIN/1.73 M^2
GLUCOSE SERPL-MCNC: 138 MG/DL (ref 70–110)
HCT VFR BLD AUTO: 38.4 % (ref 40–54)
HGB BLD-MCNC: 12.3 G/DL (ref 14–18)
INR PPP: 1.2 (ref 0.8–1.2)
LYMPHOCYTES # BLD AUTO: 1.2 K/UL (ref 1–4.8)
LYMPHOCYTES NFR BLD: 25.6 % (ref 18–48)
MCH RBC QN AUTO: 30.1 PG (ref 27–31)
MCHC RBC AUTO-ENTMCNC: 32 G/DL (ref 32–36)
MCV RBC AUTO: 94 FL (ref 82–98)
MONOCYTES # BLD AUTO: 0.4 K/UL (ref 0.3–1)
MONOCYTES NFR BLD: 9.3 % (ref 4–15)
NEUTROPHILS # BLD AUTO: 2.9 K/UL (ref 1.8–7.7)
NEUTROPHILS NFR BLD: 61.5 % (ref 38–73)
PLATELET # BLD AUTO: 150 K/UL (ref 150–350)
PMV BLD AUTO: 9.9 FL (ref 9.2–12.9)
POTASSIUM SERPL-SCNC: 4 MMOL/L (ref 3.5–5.1)
PROTHROMBIN TIME: 12.1 SEC (ref 9–12.5)
RBC # BLD AUTO: 4.09 M/UL (ref 4.6–6.2)
SODIUM SERPL-SCNC: 141 MMOL/L (ref 136–145)
WBC # BLD AUTO: 4.64 K/UL (ref 3.9–12.7)

## 2019-04-11 PROCEDURE — 85025 COMPLETE CBC W/AUTO DIFF WBC: CPT

## 2019-04-11 PROCEDURE — 85610 PROTHROMBIN TIME: CPT

## 2019-04-11 PROCEDURE — 80048 BASIC METABOLIC PNL TOTAL CA: CPT

## 2019-04-11 PROCEDURE — 36415 COLL VENOUS BLD VENIPUNCTURE: CPT

## 2019-04-15 ENCOUNTER — TELEPHONE (OUTPATIENT)
Dept: ELECTROPHYSIOLOGY | Facility: CLINIC | Age: 69
End: 2019-04-15

## 2019-04-15 ENCOUNTER — ANESTHESIA EVENT (OUTPATIENT)
Dept: MEDSURG UNIT | Facility: HOSPITAL | Age: 69
End: 2019-04-15
Payer: MEDICARE

## 2019-04-15 NOTE — TELEPHONE ENCOUNTER
Contacted Pt to confirm ablation for tomorrow. Spoke with wife and reviewed pre op instructions including: arrival time of 5:45 am, to bring CT scan on disc form [facility] and spinal cord stimulator remote, To hold eliquis amaryl , metoprolol morning of procedure, NPO after MN and to take all other meds with sip of water. She voiced understanding and stated the remote and disc where in her purse already.

## 2019-04-16 ENCOUNTER — HOSPITAL ENCOUNTER (OUTPATIENT)
Facility: HOSPITAL | Age: 69
Discharge: HOME | End: 2019-04-17
Attending: INTERNAL MEDICINE | Admitting: INTERNAL MEDICINE
Payer: MEDICARE

## 2019-04-16 ENCOUNTER — HOSPITAL ENCOUNTER (OUTPATIENT)
Dept: CARDIOLOGY | Facility: CLINIC | Age: 69
Discharge: HOME | End: 2019-04-16
Payer: MEDICARE

## 2019-04-16 ENCOUNTER — ANESTHESIA (OUTPATIENT)
Dept: MEDSURG UNIT | Facility: HOSPITAL | Age: 69
End: 2019-04-16
Payer: MEDICARE

## 2019-04-16 DIAGNOSIS — I48.19 PERSISTENT ATRIAL FIBRILLATION: Primary | ICD-10-CM

## 2019-04-16 DIAGNOSIS — I48.0 PAROXYSMAL ATRIAL FIBRILLATION: Primary | ICD-10-CM

## 2019-04-16 DIAGNOSIS — I48.91 ATRIAL FIBRILLATION: ICD-10-CM

## 2019-04-16 LAB
ABO + RH BLD: NORMAL
BASOPHILS # BLD AUTO: 0.02 K/UL (ref 0–0.2)
BASOPHILS NFR BLD: 0.4 % (ref 0–1.9)
BLD GP AB SCN CELLS X3 SERPL QL: NORMAL
BSA FOR ECHO PROCEDURE: 2.59 M2
CREAT SERPL-MCNC: 1.4 MG/DL (ref 0.5–1.4)
DIFFERENTIAL METHOD: ABNORMAL
EOSINOPHIL # BLD AUTO: 0.1 K/UL (ref 0–0.5)
EOSINOPHIL NFR BLD: 1.8 % (ref 0–8)
ERYTHROCYTE [DISTWIDTH] IN BLOOD BY AUTOMATED COUNT: 15.3 % (ref 11.5–14.5)
EST. GFR  (AFRICAN AMERICAN): 58.8 ML/MIN/1.73 M^2
EST. GFR  (NON AFRICAN AMERICAN): 50.9 ML/MIN/1.73 M^2
HCT VFR BLD AUTO: 36.7 % (ref 40–54)
HGB BLD-MCNC: 11.8 G/DL (ref 14–18)
IMM GRANULOCYTES # BLD AUTO: 0.03 K/UL (ref 0–0.04)
IMM GRANULOCYTES NFR BLD AUTO: 0.5 % (ref 0–0.5)
LYMPHOCYTES # BLD AUTO: 0.9 K/UL (ref 1–4.8)
LYMPHOCYTES NFR BLD: 16 % (ref 18–48)
MCH RBC QN AUTO: 29.6 PG (ref 27–31)
MCHC RBC AUTO-ENTMCNC: 32.2 G/DL (ref 32–36)
MCV RBC AUTO: 92 FL (ref 82–98)
MONOCYTES # BLD AUTO: 0.5 K/UL (ref 0.3–1)
MONOCYTES NFR BLD: 9.7 % (ref 4–15)
NEUTROPHILS # BLD AUTO: 3.9 K/UL (ref 1.8–7.7)
NEUTROPHILS NFR BLD: 71.6 % (ref 38–73)
NRBC BLD-RTO: 0 /100 WBC
PLATELET # BLD AUTO: 118 K/UL (ref 150–350)
PMV BLD AUTO: 10 FL (ref 9.2–12.9)
POCT GLUCOSE: 112 MG/DL (ref 70–110)
POCT GLUCOSE: 120 MG/DL (ref 70–110)
POCT GLUCOSE: 126 MG/DL (ref 70–110)
PROX AORTA: 3.5 CM
RBC # BLD AUTO: 3.98 M/UL (ref 4.6–6.2)
SINUS: 3.8 CM
STJ: 3.4 CM
WBC # BLD AUTO: 5.49 K/UL (ref 3.9–12.7)

## 2019-04-16 PROCEDURE — C1751 CATH, INF, PER/CENT/MIDLINE: HCPCS | Performed by: NURSE ANESTHETIST, CERTIFIED REGISTERED

## 2019-04-16 PROCEDURE — 93312 TRANSESOPHAGEAL ECHO (TEE) (CUPID ONLY): ICD-10-PCS | Mod: S$GLB,,, | Performed by: INTERNAL MEDICINE

## 2019-04-16 PROCEDURE — D9220A PRA ANESTHESIA: ICD-10-PCS | Mod: CRNA,,, | Performed by: NURSE ANESTHETIST, CERTIFIED REGISTERED

## 2019-04-16 PROCEDURE — 25000003 PHARM REV CODE 250: Performed by: ANESTHESIOLOGY

## 2019-04-16 PROCEDURE — 93613 INTRACARDIAC EPHYS 3D MAPG: CPT | Performed by: INTERNAL MEDICINE

## 2019-04-16 PROCEDURE — 63600175 PHARM REV CODE 636 W HCPCS: Performed by: ANESTHESIOLOGY

## 2019-04-16 PROCEDURE — 86850 RBC ANTIBODY SCREEN: CPT

## 2019-04-16 PROCEDURE — 27800505 HC CATH, RADIAL ARTERY KIT: Performed by: ANESTHESIOLOGY

## 2019-04-16 PROCEDURE — 93005 ELECTROCARDIOGRAM TRACING: CPT | Mod: 59

## 2019-04-16 PROCEDURE — 93320 TRANSESOPHAGEAL ECHO (TEE) (CUPID ONLY): ICD-10-PCS | Mod: S$GLB,,, | Performed by: INTERNAL MEDICINE

## 2019-04-16 PROCEDURE — 93662 PR INTRACARD ECHO, THER/DX INTERVENT: ICD-10-PCS | Mod: 26,,, | Performed by: INTERNAL MEDICINE

## 2019-04-16 PROCEDURE — C1894 INTRO/SHEATH, NON-LASER: HCPCS | Performed by: INTERNAL MEDICINE

## 2019-04-16 PROCEDURE — 93010 EKG 12-LEAD: ICD-10-PCS | Mod: ,,, | Performed by: INTERNAL MEDICINE

## 2019-04-16 PROCEDURE — D9220A PRA ANESTHESIA: Mod: ANES,,, | Performed by: ANESTHESIOLOGY

## 2019-04-16 PROCEDURE — C1753 CATH, INTRAVAS ULTRASOUND: HCPCS | Performed by: INTERNAL MEDICINE

## 2019-04-16 PROCEDURE — 93312 ECHO TRANSESOPHAGEAL: CPT | Mod: S$GLB,,, | Performed by: INTERNAL MEDICINE

## 2019-04-16 PROCEDURE — 37000009 HC ANESTHESIA EA ADD 15 MINS: Performed by: INTERNAL MEDICINE

## 2019-04-16 PROCEDURE — 27100023 HC RADIAL ARTERY SENSOR/TENSYS: Performed by: NURSE ANESTHETIST, CERTIFIED REGISTERED

## 2019-04-16 PROCEDURE — 93320 DOPPLER ECHO COMPLETE: CPT | Mod: S$GLB,,, | Performed by: INTERNAL MEDICINE

## 2019-04-16 PROCEDURE — C1766 INTRO/SHEATH,STRBLE,NON-PEEL: HCPCS | Performed by: INTERNAL MEDICINE

## 2019-04-16 PROCEDURE — 36620 PR INSERT CATH,ART,PERCUT,SHORTTERM: ICD-10-PCS | Mod: 59,,, | Performed by: ANESTHESIOLOGY

## 2019-04-16 PROCEDURE — 25000003 PHARM REV CODE 250: Performed by: INTERNAL MEDICINE

## 2019-04-16 PROCEDURE — 93656 COMPRE EP EVAL ABLTJ ATR FIB: CPT | Performed by: INTERNAL MEDICINE

## 2019-04-16 PROCEDURE — 82565 ASSAY OF CREATININE: CPT

## 2019-04-16 PROCEDURE — 27201037 HC PRESSURE MONITORING SET UP

## 2019-04-16 PROCEDURE — 93656 COMPRE EP EVAL ABLTJ ATR FIB: CPT | Mod: ,,, | Performed by: INTERNAL MEDICINE

## 2019-04-16 PROCEDURE — 27000221 HC OXYGEN, UP TO 24 HOURS

## 2019-04-16 PROCEDURE — 93010 ELECTROCARDIOGRAM REPORT: CPT | Mod: ,,, | Performed by: INTERNAL MEDICINE

## 2019-04-16 PROCEDURE — 93655 ICAR CATH ABLTJ DSCRT ARRHYT: CPT | Mod: ,,, | Performed by: INTERNAL MEDICINE

## 2019-04-16 PROCEDURE — 93657 TX L/R ATRIAL FIB ADDL: CPT | Performed by: INTERNAL MEDICINE

## 2019-04-16 PROCEDURE — 93657 PR TX L/R ATRIAL FIB ADDL: ICD-10-PCS | Mod: ,,, | Performed by: INTERNAL MEDICINE

## 2019-04-16 PROCEDURE — 82962 GLUCOSE BLOOD TEST: CPT | Performed by: INTERNAL MEDICINE

## 2019-04-16 PROCEDURE — C1730 CATH, EP, 19 OR FEW ELECT: HCPCS | Performed by: INTERNAL MEDICINE

## 2019-04-16 PROCEDURE — 82962 GLUCOSE BLOOD TEST: CPT

## 2019-04-16 PROCEDURE — 93325 TRANSESOPHAGEAL ECHO (TEE) (CUPID ONLY): ICD-10-PCS | Mod: S$GLB,,, | Performed by: INTERNAL MEDICINE

## 2019-04-16 PROCEDURE — 93662 INTRACARDIAC ECG (ICE): CPT | Mod: 26,,, | Performed by: INTERNAL MEDICINE

## 2019-04-16 PROCEDURE — 93662 INTRACARDIAC ECG (ICE): CPT | Performed by: INTERNAL MEDICINE

## 2019-04-16 PROCEDURE — 85025 COMPLETE CBC W/AUTO DIFF WBC: CPT

## 2019-04-16 PROCEDURE — C1732 CATH, EP, DIAG/ABL, 3D/VECT: HCPCS | Performed by: INTERNAL MEDICINE

## 2019-04-16 PROCEDURE — D9220A PRA ANESTHESIA: ICD-10-PCS | Mod: ANES,,, | Performed by: ANESTHESIOLOGY

## 2019-04-16 PROCEDURE — 93325 DOPPLER ECHO COLOR FLOW MAPG: CPT | Mod: S$GLB,,, | Performed by: INTERNAL MEDICINE

## 2019-04-16 PROCEDURE — 27201423 OPTIME MED/SURG SUP & DEVICES STERILE SUPPLY: Performed by: INTERNAL MEDICINE

## 2019-04-16 PROCEDURE — 27200677 HC TRANSDUCER MONITOR KIT SINGLE: Performed by: NURSE ANESTHETIST, CERTIFIED REGISTERED

## 2019-04-16 PROCEDURE — 25000003 PHARM REV CODE 250: Performed by: NURSE ANESTHETIST, CERTIFIED REGISTERED

## 2019-04-16 PROCEDURE — 93656 PR ELECTROPHYS EVAL, COMPREHEN, W/ABLATION OF ATRIAL FIBR: ICD-10-PCS | Mod: ,,, | Performed by: INTERNAL MEDICINE

## 2019-04-16 PROCEDURE — 93613 PR INTRACARD ELECTROPHYS 3-DIMENS MAPPING: ICD-10-PCS | Mod: ,,, | Performed by: INTERNAL MEDICINE

## 2019-04-16 PROCEDURE — 93657 TX L/R ATRIAL FIB ADDL: CPT | Mod: ,,, | Performed by: INTERNAL MEDICINE

## 2019-04-16 PROCEDURE — 93655 PR ABLATE ARRHYTHMIA ADD ON: ICD-10-PCS | Mod: ,,, | Performed by: INTERNAL MEDICINE

## 2019-04-16 PROCEDURE — 37000008 HC ANESTHESIA 1ST 15 MINUTES: Performed by: INTERNAL MEDICINE

## 2019-04-16 PROCEDURE — 25000003 PHARM REV CODE 250: Performed by: NURSE PRACTITIONER

## 2019-04-16 PROCEDURE — 94761 N-INVAS EAR/PLS OXIMETRY MLT: CPT

## 2019-04-16 PROCEDURE — 63600175 PHARM REV CODE 636 W HCPCS: Performed by: NURSE ANESTHETIST, CERTIFIED REGISTERED

## 2019-04-16 PROCEDURE — 93613 INTRACARDIAC EPHYS 3D MAPG: CPT | Mod: ,,, | Performed by: INTERNAL MEDICINE

## 2019-04-16 PROCEDURE — 25000242 PHARM REV CODE 250 ALT 637 W/ HCPCS: Performed by: NURSE ANESTHETIST, CERTIFIED REGISTERED

## 2019-04-16 PROCEDURE — 36620 INSERTION CATHETER ARTERY: CPT | Mod: 59,,, | Performed by: ANESTHESIOLOGY

## 2019-04-16 PROCEDURE — D9220A PRA ANESTHESIA: Mod: CRNA,,, | Performed by: NURSE ANESTHETIST, CERTIFIED REGISTERED

## 2019-04-16 PROCEDURE — 93005 ELECTROCARDIOGRAM TRACING: CPT

## 2019-04-16 PROCEDURE — 93655 ICAR CATH ABLTJ DSCRT ARRHYT: CPT | Performed by: INTERNAL MEDICINE

## 2019-04-16 RX ORDER — SODIUM CHLORIDE 9 MG/ML
INJECTION, SOLUTION INTRAVENOUS CONTINUOUS
Status: DISCONTINUED | OUTPATIENT
Start: 2019-04-16 | End: 2019-04-17 | Stop reason: HOSPADM

## 2019-04-16 RX ORDER — KETAMINE HYDROCHLORIDE 10 MG/ML
INJECTION, SOLUTION INTRAMUSCULAR; INTRAVENOUS
Status: DISCONTINUED | OUTPATIENT
Start: 2019-04-16 | End: 2019-04-16

## 2019-04-16 RX ORDER — ACETAMINOPHEN 325 MG/1
650 TABLET ORAL EVERY 6 HOURS PRN
Status: DISCONTINUED | OUTPATIENT
Start: 2019-04-16 | End: 2019-04-17 | Stop reason: HOSPADM

## 2019-04-16 RX ORDER — FENTANYL CITRATE 50 UG/ML
INJECTION, SOLUTION INTRAMUSCULAR; INTRAVENOUS
Status: DISCONTINUED | OUTPATIENT
Start: 2019-04-16 | End: 2019-04-16

## 2019-04-16 RX ORDER — ALBUTEROL SULFATE 2.5 MG/.5ML
2.5 SOLUTION RESPIRATORY (INHALATION) EVERY 6 HOURS PRN
Status: DISCONTINUED | OUTPATIENT
Start: 2019-04-16 | End: 2019-04-17 | Stop reason: HOSPADM

## 2019-04-16 RX ORDER — FUROSEMIDE 10 MG/ML
INJECTION INTRAMUSCULAR; INTRAVENOUS
Status: DISCONTINUED | OUTPATIENT
Start: 2019-04-16 | End: 2019-04-16

## 2019-04-16 RX ORDER — ALBUTEROL SULFATE 90 UG/1
AEROSOL, METERED RESPIRATORY (INHALATION)
Status: DISCONTINUED | OUTPATIENT
Start: 2019-04-16 | End: 2019-04-16

## 2019-04-16 RX ORDER — CEFAZOLIN SODIUM 1 G/3ML
INJECTION, POWDER, FOR SOLUTION INTRAMUSCULAR; INTRAVENOUS
Status: DISCONTINUED | OUTPATIENT
Start: 2019-04-16 | End: 2019-04-16

## 2019-04-16 RX ORDER — MIDAZOLAM HYDROCHLORIDE 1 MG/ML
INJECTION, SOLUTION INTRAMUSCULAR; INTRAVENOUS
Status: DISCONTINUED | OUTPATIENT
Start: 2019-04-16 | End: 2019-04-16

## 2019-04-16 RX ORDER — FUROSEMIDE 20 MG/1
20 TABLET ORAL 2 TIMES DAILY
Status: DISCONTINUED | OUTPATIENT
Start: 2019-04-16 | End: 2019-04-17 | Stop reason: HOSPADM

## 2019-04-16 RX ORDER — TRAMADOL HYDROCHLORIDE 50 MG/1
50 TABLET ORAL EVERY 6 HOURS PRN
Status: DISCONTINUED | OUTPATIENT
Start: 2019-04-16 | End: 2019-04-17 | Stop reason: HOSPADM

## 2019-04-16 RX ORDER — PRAMIPEXOLE DIHYDROCHLORIDE 0.12 MG/1
0.12 TABLET ORAL NIGHTLY PRN
Status: DISCONTINUED | OUTPATIENT
Start: 2019-04-16 | End: 2019-04-17 | Stop reason: HOSPADM

## 2019-04-16 RX ORDER — PANTOPRAZOLE SODIUM 40 MG/1
40 TABLET, DELAYED RELEASE ORAL DAILY
Status: DISCONTINUED | OUTPATIENT
Start: 2019-04-16 | End: 2019-04-17 | Stop reason: HOSPADM

## 2019-04-16 RX ORDER — METOPROLOL SUCCINATE 100 MG/1
100 TABLET, EXTENDED RELEASE ORAL 2 TIMES DAILY
Status: DISCONTINUED | OUTPATIENT
Start: 2019-04-16 | End: 2019-04-17 | Stop reason: HOSPADM

## 2019-04-16 RX ORDER — MEPERIDINE HYDROCHLORIDE 50 MG/ML
12.5 INJECTION INTRAMUSCULAR; INTRAVENOUS; SUBCUTANEOUS ONCE AS NEEDED
Status: DISCONTINUED | OUTPATIENT
Start: 2019-04-16 | End: 2019-04-17 | Stop reason: HOSPADM

## 2019-04-16 RX ORDER — SODIUM CHLORIDE 0.9 % (FLUSH) 0.9 %
3 SYRINGE (ML) INJECTION
Status: DISCONTINUED | OUTPATIENT
Start: 2019-04-16 | End: 2019-04-17 | Stop reason: HOSPADM

## 2019-04-16 RX ORDER — LANOLIN ALCOHOL/MO/W.PET/CERES
400 CREAM (GRAM) TOPICAL 2 TIMES DAILY
Status: DISCONTINUED | OUTPATIENT
Start: 2019-04-16 | End: 2019-04-17 | Stop reason: HOSPADM

## 2019-04-16 RX ORDER — TAMSULOSIN HYDROCHLORIDE 0.4 MG/1
0.4 CAPSULE ORAL DAILY
Status: DISCONTINUED | OUTPATIENT
Start: 2019-04-16 | End: 2019-04-17 | Stop reason: HOSPADM

## 2019-04-16 RX ORDER — HYDROMORPHONE HYDROCHLORIDE 1 MG/ML
0.2 INJECTION, SOLUTION INTRAMUSCULAR; INTRAVENOUS; SUBCUTANEOUS EVERY 5 MIN PRN
Status: DISCONTINUED | OUTPATIENT
Start: 2019-04-16 | End: 2019-04-17 | Stop reason: HOSPADM

## 2019-04-16 RX ORDER — HEPARIN SODIUM 1000 [USP'U]/ML
INJECTION, SOLUTION INTRAVENOUS; SUBCUTANEOUS
Status: DISCONTINUED | OUTPATIENT
Start: 2019-04-16 | End: 2019-04-16

## 2019-04-16 RX ORDER — IBUPROFEN 400 MG/1
400 TABLET ORAL EVERY 8 HOURS PRN
Status: DISCONTINUED | OUTPATIENT
Start: 2019-04-16 | End: 2019-04-17 | Stop reason: HOSPADM

## 2019-04-16 RX ORDER — LIDOCAINE HYDROCHLORIDE 20 MG/ML
INJECTION, SOLUTION INFILTRATION; PERINEURAL
Status: DISCONTINUED | OUTPATIENT
Start: 2019-04-16 | End: 2019-04-17 | Stop reason: HOSPADM

## 2019-04-16 RX ORDER — LIDOCAINE HCL/PF 100 MG/5ML
SYRINGE (ML) INTRAVENOUS
Status: DISCONTINUED | OUTPATIENT
Start: 2019-04-16 | End: 2019-04-16

## 2019-04-16 RX ORDER — PHENYLEPHRINE HYDROCHLORIDE 10 MG/ML
INJECTION INTRAVENOUS
Status: DISCONTINUED | OUTPATIENT
Start: 2019-04-16 | End: 2019-04-16

## 2019-04-16 RX ORDER — EPINEPHRINE 1 MG/ML
INJECTION, SOLUTION INTRACARDIAC; INTRAMUSCULAR; INTRAVENOUS; SUBCUTANEOUS
Status: DISCONTINUED | OUTPATIENT
Start: 2019-04-16 | End: 2019-04-16

## 2019-04-16 RX ORDER — PROPOFOL 10 MG/ML
VIAL (ML) INTRAVENOUS
Status: DISCONTINUED | OUTPATIENT
Start: 2019-04-16 | End: 2019-04-16

## 2019-04-16 RX ORDER — GLYCOPYRROLATE 0.2 MG/ML
INJECTION INTRAMUSCULAR; INTRAVENOUS
Status: DISCONTINUED | OUTPATIENT
Start: 2019-04-16 | End: 2019-04-16

## 2019-04-16 RX ORDER — HEPARIN SODIUM 10000 [USP'U]/100ML
INJECTION, SOLUTION INTRAVENOUS CONTINUOUS PRN
Status: DISCONTINUED | OUTPATIENT
Start: 2019-04-16 | End: 2019-04-16

## 2019-04-16 RX ORDER — ESCITALOPRAM OXALATE 20 MG/1
20 TABLET ORAL DAILY
Status: DISCONTINUED | OUTPATIENT
Start: 2019-04-17 | End: 2019-04-17 | Stop reason: HOSPADM

## 2019-04-16 RX ORDER — PROTAMINE SULFATE 10 MG/ML
INJECTION, SOLUTION INTRAVENOUS
Status: DISCONTINUED | OUTPATIENT
Start: 2019-04-16 | End: 2019-04-16

## 2019-04-16 RX ORDER — FLECAINIDE ACETATE 50 MG/1
100 TABLET ORAL 2 TIMES DAILY
Status: DISCONTINUED | OUTPATIENT
Start: 2019-04-16 | End: 2019-04-17 | Stop reason: HOSPADM

## 2019-04-16 RX ORDER — SODIUM CHLORIDE 0.9 % (FLUSH) 0.9 %
5 SYRINGE (ML) INJECTION
Status: DISCONTINUED | OUTPATIENT
Start: 2019-04-16 | End: 2019-04-16 | Stop reason: HOSPADM

## 2019-04-16 RX ORDER — SUCCINYLCHOLINE CHLORIDE 20 MG/ML
INJECTION INTRAMUSCULAR; INTRAVENOUS
Status: DISCONTINUED | OUTPATIENT
Start: 2019-04-16 | End: 2019-04-16

## 2019-04-16 RX ADMIN — GLYCOPYRROLATE 0.2 MG: 0.2 INJECTION, SOLUTION INTRAMUSCULAR; INTRAVENOUS at 08:04

## 2019-04-16 RX ADMIN — FENTANYL CITRATE 100 MCG: 50 INJECTION, SOLUTION INTRAMUSCULAR; INTRAVENOUS at 08:04

## 2019-04-16 RX ADMIN — CEFAZOLIN 2 G: 330 INJECTION, POWDER, FOR SOLUTION INTRAMUSCULAR; INTRAVENOUS at 09:04

## 2019-04-16 RX ADMIN — TRAMADOL HYDROCHLORIDE 50 MG: 50 TABLET, FILM COATED ORAL at 04:04

## 2019-04-16 RX ADMIN — PROMETHAZINE HYDROCHLORIDE 6.25 MG: 25 INJECTION INTRAMUSCULAR; INTRAVENOUS at 02:04

## 2019-04-16 RX ADMIN — KETAMINE HYDROCHLORIDE 50 MG: 10 INJECTION, SOLUTION INTRAMUSCULAR; INTRAVENOUS at 08:04

## 2019-04-16 RX ADMIN — HEPARIN SODIUM AND DEXTROSE 5000 UNITS/HR: 10000; 5 INJECTION INTRAVENOUS at 09:04

## 2019-04-16 RX ADMIN — EPINEPHRINE 10 MCG: 1 INJECTION, SOLUTION INTRAMUSCULAR; SUBCUTANEOUS at 11:04

## 2019-04-16 RX ADMIN — EPINEPHRINE 5 MCG: 1 INJECTION, SOLUTION INTRAMUSCULAR; SUBCUTANEOUS at 08:04

## 2019-04-16 RX ADMIN — KETAMINE HYDROCHLORIDE 50 MG: 10 INJECTION, SOLUTION INTRAMUSCULAR; INTRAVENOUS at 12:04

## 2019-04-16 RX ADMIN — ALBUTEROL SULFATE 3 PUFF: 90 AEROSOL, METERED RESPIRATORY (INHALATION) at 08:04

## 2019-04-16 RX ADMIN — FUROSEMIDE 40 MG: 10 INJECTION, SOLUTION INTRAMUSCULAR; INTRAVENOUS at 11:04

## 2019-04-16 RX ADMIN — ALBUTEROL SULFATE 10 PUFF: 90 AEROSOL, METERED RESPIRATORY (INHALATION) at 11:04

## 2019-04-16 RX ADMIN — HEPARIN SODIUM 3000 UNITS: 1000 INJECTION INTRAVENOUS; SUBCUTANEOUS at 10:04

## 2019-04-16 RX ADMIN — PHENYLEPHRINE HYDROCHLORIDE 200 MCG: 10 INJECTION INTRAVENOUS at 08:04

## 2019-04-16 RX ADMIN — SODIUM CHLORIDE, SODIUM GLUCONATE, SODIUM ACETATE, POTASSIUM CHLORIDE, MAGNESIUM CHLORIDE, SODIUM PHOSPHATE, DIBASIC, AND POTASSIUM PHOSPHATE: .53; .5; .37; .037; .03; .012; .00082 INJECTION, SOLUTION INTRAVENOUS at 08:04

## 2019-04-16 RX ADMIN — PANTOPRAZOLE SODIUM 40 MG: 40 TABLET, DELAYED RELEASE ORAL at 04:04

## 2019-04-16 RX ADMIN — TAMSULOSIN HYDROCHLORIDE 0.4 MG: 0.4 CAPSULE ORAL at 04:04

## 2019-04-16 RX ADMIN — PROTAMINE SULFATE 40 MG: 10 INJECTION, SOLUTION INTRAVENOUS at 11:04

## 2019-04-16 RX ADMIN — EPINEPHRINE 20 MCG: 1 INJECTION, SOLUTION INTRAMUSCULAR; SUBCUTANEOUS at 12:04

## 2019-04-16 RX ADMIN — SUCCINYLCHOLINE CHLORIDE 160 MG: 20 INJECTION, SOLUTION INTRAMUSCULAR; INTRAVENOUS at 08:04

## 2019-04-16 RX ADMIN — SODIUM CHLORIDE, SODIUM GLUCONATE, SODIUM ACETATE, POTASSIUM CHLORIDE, MAGNESIUM CHLORIDE, SODIUM PHOSPHATE, DIBASIC, AND POTASSIUM PHOSPHATE: .53; .5; .37; .037; .03; .012; .00082 INJECTION, SOLUTION INTRAVENOUS at 12:04

## 2019-04-16 RX ADMIN — FUROSEMIDE 20 MG: 20 TABLET ORAL at 07:04

## 2019-04-16 RX ADMIN — HEPARIN SODIUM 9000 UNITS: 1000 INJECTION INTRAVENOUS; SUBCUTANEOUS at 09:04

## 2019-04-16 RX ADMIN — SODIUM CHLORIDE 1 MCG/KG/MIN: 9 INJECTION, SOLUTION INTRAVENOUS at 08:04

## 2019-04-16 RX ADMIN — METOPROLOL SUCCINATE 100 MG: 100 TABLET, EXTENDED RELEASE ORAL at 09:04

## 2019-04-16 RX ADMIN — SODIUM CHLORIDE: 0.9 INJECTION, SOLUTION INTRAVENOUS at 07:04

## 2019-04-16 RX ADMIN — APIXABAN 5 MG: 5 TABLET, FILM COATED ORAL at 07:04

## 2019-04-16 RX ADMIN — MIDAZOLAM 2 MG: 1 INJECTION INTRAMUSCULAR; INTRAVENOUS at 08:04

## 2019-04-16 RX ADMIN — FLECAINIDE ACETATE 100 MG: 50 TABLET ORAL at 04:04

## 2019-04-16 RX ADMIN — PROTAMINE SULFATE 10 MG: 10 INJECTION, SOLUTION INTRAVENOUS at 11:04

## 2019-04-16 RX ADMIN — PROTAMINE SULFATE 30 MG: 10 INJECTION, SOLUTION INTRAVENOUS at 11:04

## 2019-04-16 RX ADMIN — MAGNESIUM OXIDE TAB 400 MG (241.3 MG ELEMENTAL MG) 400 MG: 400 (241.3 MG) TAB at 09:04

## 2019-04-16 RX ADMIN — PROPOFOL 150 MG: 10 INJECTION, EMULSION INTRAVENOUS at 08:04

## 2019-04-16 RX ADMIN — LIDOCAINE HYDROCHLORIDE 100 MG: 20 INJECTION, SOLUTION INTRAVENOUS at 08:04

## 2019-04-16 RX ADMIN — HEPARIN SODIUM 6000 UNITS: 1000 INJECTION INTRAVENOUS; SUBCUTANEOUS at 09:04

## 2019-04-16 NOTE — PROGRESS NOTES
Received report from Barbara CEJA. Patient s/p PVI, AAOx3. VSS, c/o chronic back pain, resp even and unlabored. Gauze/tegaderm dressing to bilateral groin sites is CDI. No active bleeding. No hematoma noted. Post procedure protocol reviewed with patient and patient's family. Understanding verbalized. Family members at bedside. Nurse call bell within reach. Will continue to monitor per post procedure protocol.

## 2019-04-16 NOTE — HPI
He is a 68 year old male with a history of HTN, HLD, DM2, ISIDRO on CPAP, home O2, TIAs in 2005 here for outpatient PVI with Dr. Potter. His last 2D echo in 1/2018 showed an EF of 60%, LA diameter 4 cm, septal wall thickness 1 cm, and no significant valvular disease. Patient reports that he had a hx of prior esophageal dilation. Has no longer any issues swallowing. He states he took his eliquis last night and held his dose this morning for PVI.     Dysphagia or odynophagia:  No  Liver Disease, esophageal disease, or known varices:  No  Upper GI Bleeding: No  Snoring:  Yes  Sleep Apnea:  Yes  Prior neck surgery or radiation:  No  History of anesthetic difficulties:  No  Family history of anesthetic difficulties:  No  Last oral intake:  12 hours ago  Able to move neck in all directions:  Yes    Mallampati: 3  ASA: 3

## 2019-04-16 NOTE — SUBJECTIVE & OBJECTIVE
Past Medical History:   Diagnosis Date    Actinic keratosis 9/2/2016    Acute ITP 2/6/2008    Arthritis     Cancer of skin of right upper extremity 9/2/2016    Degenerative disc disease     Diabetes mellitus     Gout     Hypertension     Kidney stone     Malignant neoplasm of skin of right upper extremity 10/24/2016    Malignant neoplasm of skin of right upper extremity 10/24/2016    Nephrolithiasis     Rheumatoid arthritis     RLS (restless legs syndrome)     Stroke 2003; 2005 x 2     2 or 3 times (affected his speech and weak all over); ear doctor said vertigo in 5-2015 may have had a mild stroke    Thrombocytopenia     Thyroid disease     Vertigo 5/2015    Ear doctor said it may have been a mild stroke       Past Surgical History:   Procedure Laterality Date    ABLATION N/A 10/11/2018    Performed by Murtaza Potter [facility] at [facility] CATH LAB    APPENDECTOMY  1990    ARTHROSCOPY-KNEE Left 5/11/2017    Performed by Dorian Be [facility] at [facility] OR    CAROTID ENDARTERECTOMY Right 2004    CHOLECYSTECTOMY  2011    CYSTOSCOPY AND DIGITAL RECTAL EXAMINATION N/A 6/23/2015    Performed by Teto Clay MD at [facility] OR    ECHOCARDIOGRAM,TRANSESOPHAGEAL N/A 10/11/2018    Performed by Murtaza Potter [facility] at [facility] CATH LAB    EXTRACORPOREAL SHOCK WAVE LITHOTRIPSY  2000    EYE SURGERY Bilateral 2012    Cataract surgery with lens implant    HERNIA REPAIR  2011    Umbilical hernia    [facility]stimulator      [facility]    PARTIAL MEDIAL MENISCECTOMY Left 5/11/2017    Performed by Dorian Be MD at [facility] OR    ROTATOR CUFF REPAIR Right 2008    Transjugular liver biopsy N/A 5/29/2018    [facility]       Review of patient's allergies indicates:   Allergen Reactions    Ezetimibe     Rotigotine Hives    Atorvastatin Hives       No current facility-administered medications on file prior to encounter.      Current Outpatient Medications on File Prior to Encounter    Medication Sig    albuterol (PROVENTIL) 5 mg/mL nebulizer solution Take 2.5 mg by nebulization every 6 (six) hours as needed for Wheezing. Rescue    allopurinol (ZYLOPRIM) 300 MG tablet Take 300 mg by mouth once daily.     apixaban (ELIQUIS) 5 mg Tab Take 1 tablet (5 mg total) by mouth 2 (two) times daily.    aspirin (ECOTRIN) 81 MG EC tablet Take 81 mg by mouth once daily.    ergocalciferol (VITAMIN D2) 50,000 unit Cap Take 1,000 Units by mouth every 7 days.    escitalopram oxalate (LEXAPRO) 20 MG tablet Take 20 mg by mouth once daily.     fish oil-omega-3 fatty acids 300-1,000 mg capsule Take 2 g by mouth 2 (two) times daily.     folic acid (FOLVITE) 1 MG tablet Take 1,000 mcg by mouth once daily.    furosemide (LASIX) 20 MG tablet Take 20 mg by mouth 2 (two) times daily.    glimepiride (AMARYL) 2 MG tablet Take 2 mg by mouth before breakfast.    lidocaine-prilocaine (EMLA) cream lidocaine-prilocaine 2.5 %-2.5 % topical cream    magnesium oxide (MAG-OX) 400 mg tablet Take 1 tablet by mouth 2 (two) times daily.    metoprolol succinate (TOPROL-XL) 100 MG 24 hr tablet Take 100 mg by mouth 2 (two) times daily.    pantoprazole (PROTONIX) 40 MG tablet Take 40 mg by mouth once daily.    pramipexole (MIRAPEX) 0.125 MG tablet Take 0.125 mg by mouth nightly as needed.    tamsulosin (FLOMAX) 0.4 mg Cp24 Take 1 capsule (0.4 mg total) by mouth once daily.    tramadol (ULTRAM) 50 mg tablet TAKE 1 TABLET BY MOUTH EVERY 6 HOURS AS NEEDED FOR PAIN    acetaminophen (TYLENOL) 325 MG tablet Take 2 tablets (650 mg total) by mouth every 6 (six) hours as needed.    flecainide (TAMBOCOR) 100 MG Tab Take 1 tablet (100 mg total) by mouth 2 (two) times daily.    meclizine (ANTIVERT) 25 mg tablet Take 25 mg by mouth every evening.     rosuvastatin (CRESTOR) 20 MG tablet Take 20 mg by mouth once daily.     Family History     Problem Relation (Age of Onset)    Cancer Mother, Father, Brother    Gout Brother        Tobacco  Use    Smoking status: Former Smoker     Types: Cigarettes     Start date: 1963     Last attempt to quit: 2004     Years since quittin.8    Smokeless tobacco: Never Used   Substance and Sexual Activity    Alcohol use: No    Drug use: No    Sexual activity: Yes     Partners: Female     Comment:      Review of Systems   Constitution: Negative for chills and fever.   Cardiovascular: Negative for chest pain, dyspnea on exertion, leg swelling and orthopnea.   All other systems reviewed and are negative.    Objective:     Vital Signs (Most Recent):  Temp: 97.8 °F (36.6 °C) (19 0600)  Pulse: 64 (19 0600)  Resp: 18 (19 0600)  BP: 133/68 (19 0628) Vital Signs (24h Range):  Temp:  [97.8 °F (36.6 °C)] 97.8 °F (36.6 °C)  Pulse:  [64] 64  Resp:  [18] 18  BP: (127-133)/(61-68) 133/68     Weight: 133.8 kg (295 lb)  Body mass index is 41.14 kg/m².       O2 Device (Oxygen Therapy): nasal cannula    No intake or output data in the 24 hours ending 19 0649    Lines/Drains/Airways     Peripheral Intravenous Line                 Peripheral IV - Single Lumen 19 0621 18 G Right Antecubital less than 1 day         Peripheral IV - Single Lumen 19 0640 20 G Left Forearm less than 1 day                Physical Exam   Constitutional: He is oriented to person, place, and time. He appears well-developed and well-nourished.   obese   HENT:   Head: Normocephalic and atraumatic.   Eyes: Pupils are equal, round, and reactive to light. EOM are normal.   Neck: Normal range of motion. Neck supple. No JVD present.   Cardiovascular: Normal rate and regular rhythm.   Pulmonary/Chest: Effort normal and breath sounds normal.   Abdominal: Soft. Bowel sounds are normal.   Musculoskeletal: Normal range of motion.   Neurological: He is alert and oriented to person, place, and time. He has normal reflexes.   Skin: Skin is warm and dry.       Significant Labs: All pertinent lab results from the  last 24 hours have been reviewed.    Significant Imaging: Echocardiogram: 2D echo with color flow doppler: No results found for this or any previous visit. and Transthoracic echo (TTE) complete (Cupid Only): No results found for this or any previous visit. and X-Ray: CXR: X-Ray Chest 1 View (CXR): No results found for this visit on 04/16/19.

## 2019-04-16 NOTE — PROGRESS NOTES
Bilateral groin sutures and safeguard device removed at per RN in PACU. Gauze and transparent film applied.

## 2019-04-16 NOTE — ANESTHESIA PROCEDURE NOTES
Arterial    Diagnosis: a-fib    Patient location during procedure: done in OR  Procedure start time: 4/16/2019 8:35 AM  Timeout: 4/16/2019 8:30 AM  Procedure end time: 4/16/2019 8:48 AM  Staffing  Anesthesiologist: Osmany Montgomery Jr., MD  Performed: anesthesiologist   Anesthesiologist was present at the time of the procedure.  Preanesthetic Checklist  Completed: patient identified, site marked, surgical consent, pre-op evaluation, timeout performed, IV checked, risks and benefits discussed, monitors and equipment checked and anesthesia consent givenArterial  Skin Prep: chlorhexidine gluconate  Local Infiltration: lidocaine  Orientation: left  Location: radial  Catheter Size: 20 G  Catheter placement by Anatomical landmarks and Ultrasound guidance. Heme positive aspiration all ports.  Vessel Caliber: medium, patent  Vascular Doppler:  not done  Needle advanced into vessel with real time Ultrasound guidance.  Guidewire confirmed in vessel.Insertion Attempts: 3  Assessment  Dressing: secured with tape and tegaderm  Patient: Tolerated well

## 2019-04-16 NOTE — ANESTHESIA PREPROCEDURE EVALUATION
04/16/2019  Daniel Miller is a 68 y.o., male presenting for a fib ablation.  He has significant ISIDRO hx on CPAP.    Past Medical History:   Diagnosis Date    Actinic keratosis 9/2/2016    Acute ITP 2/6/2008    Arthritis     Cancer of skin of right upper extremity 9/2/2016    Degenerative disc disease     Diabetes mellitus     Gout     Hypertension     Kidney stone     Malignant neoplasm of skin of right upper extremity 10/24/2016    Malignant neoplasm of skin of right upper extremity 10/24/2016    Nephrolithiasis     Rheumatoid arthritis     RLS (restless legs syndrome)     Stroke 2003; 2005 x 2     2 or 3 times (affected his speech and weak all over); ear doctor said vertigo in 5-2015 may have had a mild stroke    Thrombocytopenia     Thyroid disease     Vertigo 5/2015    Ear doctor said it may have been a mild stroke     Past Surgical History:   Procedure Laterality Date    ABLATION N/A 10/11/2018    Performed by Murtaza Potter MD at [facility] CATH LAB    APPENDECTOMY  1990    ARTHROSCOPY-KNEE Left 5/11/2017    Performed by Dorian Be [facility] at [facility] OR    CAROTID ENDARTERECTOMY Right 2004    CHOLECYSTECTOMY  2011    CYSTOSCOPY AND DIGITAL RECTAL EXAMINATION N/A 6/23/2015    Performed by Teto Clay MD at [facility] OR    ECHOCARDIOGRAM,TRANSESOPHAGEAL N/A 10/11/2018    Performed by Mur[facility]za Potter [facility] at [facility] CATH LAB    EXTRACORPOREAL SHOCK WAVE LITHOTRIPSY  2000    EYE SURGERY Bilateral 2012    Cataract surgery with lens implant    HERNIA REPAIR  2011    Umbilical hernia    [facility]stimulator      [facility]    PARTIAL MEDIAL MENISCECTOMY Left 5/11/2017    Performed by [facility]ian [facility] at [facility] OR    ROTATOR CUFF REPAIR Right 2008    Transjugular liver biopsy N/A 5/29/2018    Performed by [facility] Diagnostic Provider at [facility] OR [address] [place] [address]     Review of patient's allergies indicates:    Allergen Reactions    Rotigotine Hives    Lipitor [atorvastatin] Hives     Current Facility-Administered Medications on File Prior to Visit   Medication Dose Route Frequency Provider Last Rate Last Dose    0.9%  NaCl infusion   Intravenous Continuous Nery Tavera NP        sodium chloride 0.9% flush 5 mL  5 mL Intravenous PRN Nery Tavera NP         Current Outpatient Medications on File Prior to Visit   Medication Sig Dispense Refill    acetaminophen (TYLENOL) 325 MG tablet Take 2 tablets (650 mg total) by mouth every 6 (six) hours as needed.  0    albuterol (PROVENTIL) 5 mg/mL nebulizer solution Take 2.5 mg by nebulization every 6 (six) hours as needed for Wheezing. Rescue      allopurinol (ZYLOPRIM) 300 MG tablet Take 300 mg by mouth once daily.   5    apixaban (ELIQUIS) 5 mg Tab Take 1 tablet (5 mg total) by mouth 2 (two) times daily. 60 tablet 6    aspirin (ECOTRIN) 81 MG EC tablet Take 81 mg by mouth once daily.      ergocalciferol (VITAMIN D2) 50,000 unit Cap Take 1,000 Units by mouth every 7 days.      escitalopram oxalate (LEXAPRO) 20 MG tablet Take 20 mg by mouth once daily.       fish oil-omega-3 fatty acids 300-1,000 mg capsule Take 2 g by mouth 2 (two) times daily.       flecainide (TAMBOCOR) 100 MG Tab Take 1 tablet (100 mg total) by mouth 2 (two) times daily. 60 tablet 11    folic acid (FOLVITE) 1 MG tablet Take 1,000 mcg by mouth once daily.  4    furosemide (LASIX) 20 MG tablet Take 20 mg by mouth 2 (two) times daily.  5    glimepiride (AMARYL) 2 MG tablet Take 2 mg by mouth before breakfast.      lidocaine-prilocaine (EMLA) cream lidocaine-prilocaine 2.5 %-2.5 % topical cream      magnesium oxide (MAG-OX) 400 mg tablet Take 1 tablet by mouth 2 (two) times daily.  1    meclizine (ANTIVERT) 25 mg tablet Take 25 mg by mouth every evening.   3    metoprolol succinate (TOPROL-XL) 100 MG 24 hr tablet Take 100 mg by mouth 2 (two) times daily.  4    pantoprazole  (PROTONIX) 40 MG tablet Take 40 mg by mouth once daily.      pramipexole (MIRAPEX) 0.125 MG tablet Take 0.125 mg by mouth nightly as needed.  5    rosuvastatin (CRESTOR) 20 MG tablet Take 20 mg by mouth once daily.      tamsulosin (FLOMAX) 0.4 mg Cp24 Take 1 capsule (0.4 mg total) by mouth once daily. 30 capsule 5    tramadol (ULTRAM) 50 mg tablet TAKE 1 TABLET BY MOUTH EVERY 6 HOURS AS NEEDED FOR PAIN 120 tablet 1     Lab Results   Component Value Date    WBC 4.64 04/11/2019    HGB 12.3 (L) 04/11/2019    HCT 38.4 (L) 04/11/2019    MCV 94 04/11/2019     04/11/2019     BMP  Lab Results   Component Value Date     04/11/2019    K 4.0 04/11/2019     04/11/2019    CO2 28 04/11/2019    BUN 18 04/11/2019    CREATININE 1.4 04/11/2019    CALCIUM 9.6 04/11/2019    ANIONGAP 9 04/11/2019    ESTGFRAFRICA 59 (A) 04/11/2019    EGFRNONAA 51 (A) 04/11/2019         Pre-op Assessment    I have reviewed the Patient Summary Reports.      I have reviewed the Medications.     Review of Systems  Anesthesia Hx:  No problems with previous Anesthesia  History of prior surgery of interest to airway management or planning: Previous anesthesia: General Airway issues documented on chart review include mask, difficult, GETA, easy direct laryngoscopy , view on direct laryngoscopy Grade I   Denies Personal Hx of Anesthesia complications.   Hematology/Oncology:        Hematology Comments: thrombocytopenia   Cardiovascular:   Hypertension Dysrhythmias atrial fibrillation WALTERS    Pulmonary:   Denies COPD.  Denies Asthma. Sleep Apnea, CPAP    Renal/:   Chronic Renal Disease, CRI    Hepatic/GI:   GERD, well controlled Liver Disease, LONGO   Neurological:   TIA, Denies Seizures. No residual symptoms  Peripheral Neuropathy    Endocrine:   Diabetes        Physical Exam  General:  Morbid Obesity    Airway/Jaw/Neck:  Airway Findings: Mouth Opening: Small, but > 3cm Tongue: Normal  General Airway Assessment: Adult  Mallampati: III   Improves to II with phonation.  Jaw/Neck Findings:  Neck ROM: Normal ROM      Dental:  Dental Findings: upper partial dentures, lower partial dentures        Mental Status:  Mental Status Findings:  Cooperative, Alert and Oriented         Anesthesia Plan  Type of Anesthesia, risks & benefits discussed:  Anesthesia Type:  general  Patient's Preference:   Intra-op Monitoring Plan: standard ASA monitors and arterial line  Intra-op Monitoring Plan Comments:   Post Op Pain Control Plan: per primary service following discharge from PACU and IV/PO Opioids PRN  Post Op Pain Control Plan Comments:   Induction:   IV  Beta Blocker:  Patient is on a Beta-Blocker and has received one dose within the past 24 hours (No further documentation required).       Informed Consent: Patient understands risks and agrees with Anesthesia plan.  Questions answered. Anesthesia consent signed with patient.  ASA Score: 3     Day of Surgery Review of History & Physical:    H&P update referred to the surgeon.         Ready For Surgery From Anesthesia Perspective.

## 2019-04-16 NOTE — NURSING TRANSFER
Nursing Transfer Note      4/16/2019     Transfer To: 317    Transfer via bed    Transfer with cardiac monitoring per centralized telemetry, 4L O2 per NC    Transported by RN/PCT    Medicines sent: none    Chart send with patient: Yes    Notified: family in waiting area via surgical texting system    Patient reassessed at: 4/16/2019 3:00 PM

## 2019-04-16 NOTE — Clinical Note
Dr. Montgomery is going to try the dilcia one more time but if he is unsuccessful, we will acquire one through the groin. Dr. Abbey jasmine.

## 2019-04-16 NOTE — ASSESSMENT & PLAN NOTE
Assessment & Plan:     PLAN:  1. LISA for evaluation of DELIA/LA for r/o thrombus    -The risks, benefits & alternatives of the procedure were explained to the patient.    -The risks of transesophageal echo include but are not limited to:  Dental trauma, esophageal trauma/perforation, bleeding, laryngospasm/brochospasm, aspiration, sore throat/hoarseness, & dislodgement of the endotracheal tube/nasogastric tube (where applicable).    -The risks of moderate sedation include hypotension, respiratory depression, arrhythmias, bronchospasm, & death.    -Informed consent was obtained & the patient is agreeable to proceed with the procedure.    I will discuss with the attending physician. Attending addendum is to follow.     Further recommendations per attending addendum

## 2019-04-16 NOTE — Clinical Note
Extra time needed for extubation. Manual pressure continued to bilateral sutured groin to ensure hemostasis is maintained

## 2019-04-16 NOTE — PLAN OF CARE
Problem: Adult Inpatient Plan of Care  Goal: Plan of Care Review  Outcome: Ongoing (interventions implemented as appropriate)  Bilateral groin sites remain CDI, no bleeding or hematoma noted.  Will cont to monitor.

## 2019-04-16 NOTE — TRANSFER OF CARE
"Anesthesia Transfer of Care Note    Patient: Daniel Miller    Procedure(s) Performed: Procedure(s) (LRB):  ABLATION, ARRHYTHMOGENIC FOCUS, FOR ATRIAL FIBRILLATION (N/A)  ECHOCARDIOGRAM, TRANSESOPHAGEAL (N/A)    Patient location: PACU    Anesthesia Type: general    Transport from OR: Transported from OR on 6-10 L/min O2 by face mask with adequate spontaneous ventilation    Post pain: adequate analgesia    Post assessment: no apparent anesthetic complications and tolerated procedure well    Post vital signs: stable    Level of consciousness: awake    Nausea/Vomiting: no nausea/vomiting    Complications: none    Transfer of care protocol was followed      Last vitals:   Visit Vitals  BP (!) 105/53   Pulse 65   Temp 36.5 °C (97.7 °F) (Temporal)   Resp 18   Ht 5' 11" (1.803 m)   Wt 133.8 kg (295 lb)   SpO2 (!) 91%   BMI 41.14 kg/m²     "

## 2019-04-16 NOTE — BRIEF OP NOTE
Patient is s/p posterior box ablation with roof line +CTI and SVC ablation:   All 4 pulmonary veins were isolated from prior PVI procedure.   Tolerated procedure well. No acute complication noted.  Post op care per protocol.  Will monitor in recovery on tele overnight  Motrin , PPI , lasix prn   Resume elequis 6 hr after hemostasis  Access: b/l CFV, hemostasis b/l sutures   Fu EKG  EP service to follow

## 2019-04-16 NOTE — PROGRESS NOTES
Bedrest protocol complete. Pt ambulated down ashraf with RN on 3L of O2 via NC. Bilateral groin dressings are clean, dry, and intact with no evidence of bleeding or hematoma. Condom cath removed. Pt tolerated well.

## 2019-04-16 NOTE — INTERVAL H&P NOTE
The patient has been examined and the H&P has been reviewed:    I concur with the findings and no changes have occurred since H&P was written.    We discussed the procedural details, risks and benefits and alternatives of catheter ablation with the patient and family.   In addition to the procedural details, we also discussed that while unlikely, catheter ablation is associated with several potential complications including , but not limited to, infection, bleeding , vascular complications, myocardial infarction, stroke, pericardial effusion/tamponade, phrenic nerve injury, AE fsitula and, rarely, death. Patient appeared to understand the whole discussion and verbalized that all questions were answered to satisfaction. After deliberating over the options, explanation, and risks/ benefits of the ablation procedure, patient agreed to proceed with the procedure.     He has a SPINAL stimulator - he has turned it off, wife has the control.   Last dose eliquis last morning  Will get a LISA today       Active Hospital Problems    Diagnosis  POA    Persistent atrial fibrillation [I48.1]  Yes      Resolved Hospital Problems   No resolved problems to display.

## 2019-04-16 NOTE — NURSING
Ambulated on unit this morning with wife at side.  Pt on 3LNC from home.  Pt states he has a spinal cord stimulator and that the spinal cord stimulator is off.  Verbalized understanding of procedure.  C/o pain to bilateral lower back of an 8 on scale of 0-10.  C/o SOB on exertion.  Breath sounds clear.  Pt has red patchy areas on arms and legs which he states is psoriasis.  Will continue to monitor.

## 2019-04-16 NOTE — CONSULTS
[facility]  Cardiology  Consult Note    Patient Name: Daniel Miller  MRN: [id number]  Admission Date: 4/16/2019  Hospital Length of Stay: 0 days  Code Status: No Order   Attending Provider: Murtaza Potter MD   Consulting Provider: Eunice Stover MD  Primary Care Physician: John López MD  Principal Problem:<principal problem not specified>    Patient information was obtained from patient, past medical records and ER records.     Subjective:     Chief Complaint:  Outpatient PVI     HPI:   He is a 68 year old male with a history of HTN, HLD, DM2, ISIDRO on CPAP, home O2, TIAs in 2005 here for outpatient PVI with Dr. Potter. His last 2D echo in 1/2018 showed an EF of 60%, LA diameter 4 cm, septal wall thickness 1 cm, and no significant valvular disease. Patient reports that he had a hx of prior esophageal dilation. Has no longer any issues swallowing. He states he took his eliquis last night and held his dose this morning for PVI.     Dysphagia or odynophagia:  No  Liver Disease, esophageal disease, or known varices:  No  Upper GI Bleeding: No  Snoring:  Yes  Sleep Apnea:  Yes  Prior neck surgery or radiation:  No  History of anesthetic difficulties:  No  Family history of anesthetic difficulties:  No  Last oral intake:  12 hours ago  Able to move neck in all directions:  Yes    Mallampati: 3  ASA: 3    Past Medical History:   Diagnosis Date    Actinic keratosis 9/2/2016    Acute ITP 2/6/2008    Arthritis     Cancer of skin of right upper extremity 9/2/2016    Degenerative disc disease     Diabetes mellitus     Gout     Hypertension     Kidney stone     Malignant neoplasm of skin of right upper extremity 10/24/2016    Malignant neoplasm of skin of right upper extremity 10/24/2016    Nephrolithiasis     Rheumatoid arthritis     RLS (restless legs syndrome)     Stroke 2003; 2005 x 2     2 or 3 times (affected his speech and weak all over); ear doctor said vertigo in 5-2015 may have  had a mild stroke    Thrombocytopenia     Thyroid disease     Vertigo 5/2015    Ear doctor said it may have been a mild stroke       Past Surgical History:   Procedure Laterality Date    ABLATION N/A 10/11/2018    Performed by [facility] at [facility] CATH LAB    APPENDECTOMY  1990    ARTHROSCOPY-KNEE Left 5/11/2017    [facility] at [facility] OR    CAROTID ENDARTERECTOMY Right 2004    CHOLECYSTECTOMY  2011    CYSTOSCOPY AND DIGITAL RECTAL EXAMINATION N/A 6/23/2015    Performed by Teto Clay MD at [facility] OR    ECHOCARDIOGRAM,TRANSESOPHAGEAL N/A 10/11/2018    Performed by Mur[facility]za Potter MD at [facility] CATH LAB    EXTRACORPOREAL SHOCK WAVE LITHOTRIPSY  2000    EYE SURGERY Bilateral 2012    Cataract surgery with lens implant    HERNIA REPAIR  2011    Umbilical hernia    [facility]ulator      [facility]    PARTIAL MEDIAL MENISCECTOMY Left 5/11/2017    Performed by Dorian [facility] at [facility] OR    ROTATOR CUFF REPAIR Right 2008    Transjugular liver biopsy N/A 5/29/2018    Performed by [facility] Diagnostic Provider at [facility] OR [facility]       Review of patient's allergies indicates:   Allergen Reactions    Ezetimibe     Rotigotine Hives    Atorvastatin Hives       No current facility-administered medications on file prior to encounter.      Current Outpatient Medications on File Prior to Encounter   Medication Sig    albuterol (PROVENTIL) 5 mg/mL nebulizer solution Take 2.5 mg by nebulization every 6 (six) hours as needed for Wheezing. Rescue    allopurinol (ZYLOPRIM) 300 MG tablet Take 300 mg by mouth once daily.     apixaban (ELIQUIS) 5 mg Tab Take 1 tablet (5 mg total) by mouth 2 (two) times daily.    aspirin (ECOTRIN) 81 MG EC tablet Take 81 mg by mouth once daily.    ergocalciferol (VITAMIN D2) 50,000 unit Cap Take 1,000 Units by mouth every 7 days.    escitalopram oxalate (LEXAPRO) 20 MG tablet Take 20 mg by mouth once daily.     fish oil-omega-3 fatty acids 300-1,000 mg  capsule Take 2 g by mouth 2 (two) times daily.     folic acid (FOLVITE) 1 MG tablet Take 1,000 mcg by mouth once daily.    furosemide (LASIX) 20 MG tablet Take 20 mg by mouth 2 (two) times daily.    glimepiride (AMARYL) 2 MG tablet Take 2 mg by mouth before breakfast.    lidocaine-prilocaine (EMLA) cream lidocaine-prilocaine 2.5 %-2.5 % topical cream    magnesium oxide (MAG-OX) 400 mg tablet Take 1 tablet by mouth 2 (two) times daily.    metoprolol succinate (TOPROL-XL) 100 MG 24 hr tablet Take 100 mg by mouth 2 (two) times daily.    pantoprazole (PROTONIX) 40 MG tablet Take 40 mg by mouth once daily.    pramipexole (MIRAPEX) 0.125 MG tablet Take 0.125 mg by mouth nightly as needed.    tamsulosin (FLOMAX) 0.4 mg Cp24 Take 1 capsule (0.4 mg total) by mouth once daily.    tramadol (ULTRAM) 50 mg tablet TAKE 1 TABLET BY MOUTH EVERY 6 HOURS AS NEEDED FOR PAIN    acetaminophen (TYLENOL) 325 MG tablet Take 2 tablets (650 mg total) by mouth every 6 (six) hours as needed.    flecainide (TAMBOCOR) 100 MG Tab Take 1 tablet (100 mg total) by mouth 2 (two) times daily.    meclizine (ANTIVERT) 25 mg tablet Take 25 mg by mouth every evening.     rosuvastatin (CRESTOR) 20 MG tablet Take 20 mg by mouth once daily.     Family History     Problem Relation (Age of Onset)    Cancer Mother, Father, Brother    Gout Brother        Tobacco Use    Smoking status: Former Smoker     Types: Cigarettes     Start date: 1963     Last attempt to quit: 2004     Years since quittin.8    Smokeless tobacco: Never Used   Substance and Sexual Activity    Alcohol use: No    Drug use: No    Sexual activity: Yes     Partners: Female     Comment:      Review of Systems   Constitution: Negative for chills and fever.   Cardiovascular: Negative for chest pain, dyspnea on exertion, leg swelling and orthopnea.   All other systems reviewed and are negative.    Objective:     Vital Signs (Most Recent):  Temp: 97.8 °F (36.6  °C) (04/16/19 0600)  Pulse: 64 (04/16/19 0600)  Resp: 18 (04/16/19 0600)  BP: 133/68 (04/16/19 0628) Vital Signs (24h Range):  Temp:  [97.8 °F (36.6 °C)] 97.8 °F (36.6 °C)  Pulse:  [64] 64  Resp:  [18] 18  BP: (127-133)/(61-68) 133/68     Weight: 133.8 kg (295 lb)  Body mass index is 41.14 kg/m².       O2 Device (Oxygen Therapy): nasal cannula    No intake or output data in the 24 hours ending 04/16/19 0649    Lines/Drains/Airways     Peripheral Intravenous Line                 Peripheral IV - Single Lumen 04/16/19 0621 18 G Right Antecubital less than 1 day         Peripheral IV - Single Lumen 04/16/19 0640 20 G Left Forearm less than 1 day                Physical Exam   Constitutional: He is oriented to person, place, and time. He appears well-developed and well-nourished.   obese   HENT:   Head: Normocephalic and atraumatic.   Eyes: Pupils are equal, round, and reactive to light. EOM are normal.   Neck: Normal range of motion. Neck supple. No JVD present.   Cardiovascular: Normal rate and regular rhythm.   Pulmonary/Chest: Effort normal and breath sounds normal.   Abdominal: Soft. Bowel sounds are normal.   Musculoskeletal: Normal range of motion.   Neurological: He is alert and oriented to person, place, and time. He has normal reflexes.   Skin: Skin is warm and dry.       Significant Labs: All pertinent lab results from the last 24 hours have been reviewed.    Significant Imaging: Echocardiogram: 2D echo with color flow doppler: No results found for this or any previous visit. and Transthoracic echo (TTE) complete (Cupid Only): No results found for this or any previous visit. and X-Ray: CXR: X-Ray Chest 1 View (CXR): No results found for this visit on 04/16/19.    Assessment and Plan:     Persistent atrial fibrillation    Assessment & Plan:     PLAN:  1. LISA for evaluation of DELIA/LA for r/o thrombus    -The risks, benefits & alternatives of the procedure were explained to the patient.    -The risks of  transesophageal echo include but are not limited to:  Dental trauma, esophageal trauma/perforation, bleeding, laryngospasm/brochospasm, aspiration, sore throat/hoarseness, & dislodgement of the endotracheal tube/nasogastric tube (where applicable).    -The risks of moderate sedation include hypotension, respiratory depression, arrhythmias, bronchospasm, & death.    -Informed consent was obtained & the patient is agreeable to proceed with the procedure.    I will discuss with the attending physician. Attending addendum is to follow.     Further recommendations per attending addendum              VTE Risk Mitigation (From admission, onward)    None          Eunice Stover MD  Cardiology   [facility]

## 2019-04-17 VITALS
HEIGHT: 71 IN | DIASTOLIC BLOOD PRESSURE: 58 MMHG | SYSTOLIC BLOOD PRESSURE: 122 MMHG | HEART RATE: 61 BPM | WEIGHT: 295 LBS | OXYGEN SATURATION: 95 % | TEMPERATURE: 98 F | BODY MASS INDEX: 41.3 KG/M2 | RESPIRATION RATE: 18 BRPM

## 2019-04-17 LAB
POCT GLUCOSE: 115 MG/DL (ref 70–110)
POCT GLUCOSE: 153 MG/DL (ref 70–110)

## 2019-04-17 PROCEDURE — 94660 CPAP INITIATION&MGMT: CPT

## 2019-04-17 PROCEDURE — 25000003 PHARM REV CODE 250: Performed by: INTERNAL MEDICINE

## 2019-04-17 PROCEDURE — 82962 GLUCOSE BLOOD TEST: CPT

## 2019-04-17 PROCEDURE — 99900035 HC TECH TIME PER 15 MIN (STAT)

## 2019-04-17 PROCEDURE — 94761 N-INVAS EAR/PLS OXIMETRY MLT: CPT

## 2019-04-17 RX ORDER — IBUPROFEN 400 MG/1
400 TABLET ORAL EVERY 8 HOURS PRN
Qty: 100 TABLET | Refills: 0 | Status: SHIPPED | OUTPATIENT
Start: 2019-04-17 | End: 2019-07-22

## 2019-04-17 RX ADMIN — TAMSULOSIN HYDROCHLORIDE 0.4 MG: 0.4 CAPSULE ORAL at 08:04

## 2019-04-17 RX ADMIN — ESCITALOPRAM OXALATE 20 MG: 20 TABLET ORAL at 08:04

## 2019-04-17 RX ADMIN — FLECAINIDE ACETATE 100 MG: 50 TABLET ORAL at 05:04

## 2019-04-17 RX ADMIN — APIXABAN 5 MG: 5 TABLET, FILM COATED ORAL at 08:04

## 2019-04-17 RX ADMIN — MAGNESIUM OXIDE TAB 400 MG (241.3 MG ELEMENTAL MG) 400 MG: 400 (241.3 MG) TAB at 08:04

## 2019-04-17 RX ADMIN — PANTOPRAZOLE SODIUM 40 MG: 40 TABLET, DELAYED RELEASE ORAL at 08:04

## 2019-04-17 RX ADMIN — FUROSEMIDE 20 MG: 20 TABLET ORAL at 08:04

## 2019-04-17 RX ADMIN — METOPROLOL SUCCINATE 100 MG: 100 TABLET, EXTENDED RELEASE ORAL at 08:04

## 2019-04-17 NOTE — DISCHARGE SUMMARY
[facility]  Discharge Summary      Admit Date: 4/16/2019    Discharge Date and Time: 4/17/2019  9:44 AM    Attending Physician: No att. providers found     Reason for Admission: afib ablation     Procedures Performed: Procedure(s) (LRB):  ABLATION, ARRHYTHMOGENIC FOCUS, FOR ATRIAL FIBRILLATION (N/A)  ECHOCARDIOGRAM, TRANSESOPHAGEAL (N/A)    Hospital Course     Patient is s/p posterior box ablation with roof line + CTI and SVC ablation for symptomatic Afib :   All 4 pulmonary veins were isolated from prior PVI procedure.   Tolerated procedure well. No acute complication noted.  No new events on tele overnight  Motrin , PPI , lasix prn   Cont  elequis .  Cont flecainide 100 mg bid. Cont metoprolol.   Access: b/l CFV, hemostasis b/l sutures - sutures out, site soft and NT.   Cont home oxygen and cpap       Final Diagnoses:    Principal Problem: <principal problem not specified>   Secondary Diagnoses:   Active Hospital Problems    Diagnosis  POA    Persistent atrial fibrillation [I48.1]  Yes      Resolved Hospital Problems   No resolved problems to display.       Discharged Condition: stable    Disposition: Home or Self Care    Follow Up/Patient Instructions:     Medications:  Reconciled Home Medications:      Medication List      START taking these medications    ibuprofen 400 MG tablet  Commonly known as:  ADVIL,MOTRIN  Take 1 tablet (400 mg total) by mouth every 8 (eight) hours as needed (moderate pain only for 5 days post procedure).        CONTINUE taking these medications    acetaminophen 325 MG tablet  Commonly known as:  TYLENOL  Take 2 tablets (650 mg total) by mouth every 6 (six) hours as needed.     albuterol 5 mg/mL nebulizer solution  Commonly known as:  PROVENTIL  Take 2.5 mg by nebulization every 6 (six) hours as needed for Wheezing. Rescue     allopurinol 300 MG tablet  Commonly known as:  ZYLOPRIM  Take 300 mg by mouth once daily.     apixaban 5 mg Tab  Commonly known as:   ELIQUIS  Take 1 tablet (5 mg total) by mouth 2 (two) times daily.     aspirin 81 MG EC tablet  Commonly known as:  ECOTRIN  Take 81 mg by mouth once daily.     escitalopram oxalate 20 MG tablet  Commonly known as:  LEXAPRO  Take 20 mg by mouth once daily.     fish oil-omega-3 fatty acids 300-1,000 mg capsule  Take 2 g by mouth 2 (two) times daily.     flecainide 100 MG Tab  Commonly known as:  TAMBOCOR  Take 1 tablet (100 mg total) by mouth 2 (two) times daily.     folic acid 1 MG tablet  Commonly known as:  FOLVITE  Take 1,000 mcg by mouth once daily.     furosemide 20 MG tablet  Commonly known as:  LASIX  Take 20 mg by mouth 2 (two) times daily.     glimepiride 2 MG tablet  Commonly known as:  AMARYL  Take 2 mg by mouth before breakfast.     lidocaine-prilocaine cream  Commonly known as:  EMLA  lidocaine-prilocaine 2.5 %-2.5 % topical cream     magnesium oxide 400 mg (241.3 mg magnesium) tablet  Commonly known as:  MAG-OX  Take 1 tablet by mouth 2 (two) times daily.     metoprolol succinate 100 MG 24 hr tablet  Commonly known as:  TOPROL-XL  Take 100 mg by mouth 2 (two) times daily.     pantoprazole 40 MG tablet  Commonly known as:  PROTONIX  Take 40 mg by mouth once daily.     rosuvastatin 20 MG tablet  Commonly known as:  CRESTOR  Take 20 mg by mouth once daily.     tamsulosin 0.4 mg Cap  Commonly known as:  FLOMAX  Take 1 capsule (0.4 mg total) by mouth once daily.     VITAMIN D2 50,000 unit Cap  Generic drug:  ergocalciferol  Take 1,000 Units by mouth every 7 days.        ASK your doctor about these medications    meclizine 25 mg tablet  Commonly known as:  ANTIVERT  Take 25 mg by mouth every evening.     pramipexole 0.125 MG tablet  Commonly known as:  MIRAPEX  Take 0.125 mg by mouth nightly as needed.     traMADol 50 mg tablet  Commonly known as:  ULTRAM  TAKE 1 TABLET BY MOUTH EVERY 6 HOURS AS NEEDED FOR PAIN          Discharge Procedure Orders   Diet Cardiac     No driving until:   Order Comments: No  driving today and no lifting for 7 days from PVI standpoint     Notify your health care provider if you experience any of the following:  temperature >100.4     Notify your health care provider if you experience any of the following:  persistent nausea and vomiting or diarrhea     Notify your health care provider if you experience any of the following:  severe uncontrolled pain     Notify your health care provider if you experience any of the following:  redness, tenderness, or signs of infection (pain, swelling, redness, odor or green/yellow discharge around incision site)     Notify your health care provider if you experience any of the following:  difficulty breathing or increased cough     Notify your health care provider if you experience any of the following:  severe persistent headache     Notify your health care provider if you experience any of the following:  worsening rash     Notify your health care provider if you experience any of the following:  persistent dizziness, light-headedness, or visual disturbances     Notify your health care provider if you experience any of the following:  increased confusion or weakness     No dressing needed     Follow-up Information     Murtaza Potter MD In 5 weeks.    Specialties:  Electrophysiology, Cardiovascular Disease, Cardiology  Why:  post PVI  -on flecainide   Contact information:  [address]  [facility] [address]  [phone]

## 2019-04-17 NOTE — PLAN OF CARE
Problem: Adult Inpatient Plan of Care  Goal: Plan of Care Review  Outcome: Ongoing (interventions implemented as appropriate)  Report received from MARCIAL Blackburn. Patient sitting in bed, no complaints. Family at bedside. bilat groin sites cdi, soft. + pulses. Mild edema noted to ble. Vss. Ambulating independently, voiding without difficulty. Plan for dc home this am.

## 2019-04-17 NOTE — PLAN OF CARE
Problem: Adult Inpatient Plan of Care  Goal: Plan of Care Review  Outcome: Ongoing (interventions implemented as appropriate)  Pt AAO x3, NAD, s/p PVI on 4/16/19 per Dr Potter, Rt groin dsg with scant drainage that has been stable and Lt groin dsg CDI, no active bleeding and no hematoma noted. Call light within pt reach, pt instructed to call staff for any needed assistance, safety maintained. Pt denies needs/concerns so far this shift, will continue to monitor.

## 2019-04-18 LAB
POC ACTIVATED CLOTTING TIME K: 114 SEC (ref 74–137)
POC ACTIVATED CLOTTING TIME K: 230 SEC (ref 74–137)
POC ACTIVATED CLOTTING TIME K: 307 SEC (ref 74–137)
POC ACTIVATED CLOTTING TIME K: 318 SEC (ref 74–137)
POC ACTIVATED CLOTTING TIME K: 362 SEC (ref 74–137)
POC ACTIVATED CLOTTING TIME K: 379 SEC (ref 74–137)
SAMPLE: ABNORMAL
SAMPLE: NORMAL

## 2019-05-29 ENCOUNTER — TELEPHONE (OUTPATIENT)
Dept: ELECTROPHYSIOLOGY | Facility: CLINIC | Age: 69
End: 2019-05-29

## 2019-05-29 NOTE — TELEPHONE ENCOUNTER
Returned call to Pt.and spoke with wife. She reports that 2 nights ago she brought him to the hospital because he went back into AFIB and was feeling so bad: dizzy, lightheaded, nauseated and chest pain.  She states Dr Potter told him that if he went back in AFIB he would need a pacemaker.  Pt has appointment scheduled for tomm to discuss.         ----- Message from Emily Tolentino MA sent at 5/28/2019  9:50 AM CDT -----  Contact: Spouse      ----- Message -----  From: Merna Mccarthy  Sent: 5/28/2019   9:28 AM  To: Abbey Hauser Staff    .Needs Advice    Reason for call: Pt went to ER last night for Afib, Pt's HR is irregular. Pt wants to know if needs to come in earlier than 5/30.  Thanks        Communication Preference: [phone]     Additional Information:

## 2019-05-29 NOTE — PROGRESS NOTES
Mr. Miller is a patient of Dr. Potter and was last seen in clinic 4/1/2019.      Subjective:   Patient ID:  Daniel Miller is a 68 y.o. male who presents for follow-up of Atrial Flutter  .     HPI:    Mr. Miller is a 68 y.o. male with pAF, HTN, HLD, DM, ISIDRO, TIA (s/p rt CEA), and obesity here for follow up after ablation.     Background:    PCP: John López MD  Cardiologist: Magdalena Webster MD    Mr. Miller has a history of HTN, HLD, DM2, ISIDRO on CPAP, TIAs in 2005 which eventually led to a right carotid endarterectomy, and obesity, whose history of AF dates back to 1/2017 when he was brought to the [facility] for food poisoning and was found to be in AF. He had paroxysms of AF during that hospitalization. He was started on Eliquis around this time. Mr. Miller continued to have regular episodes of AF, which he thinks were occurring almost daily. Associated symptoms include fatigue, shortness of breath, and tremulousness. He was started on Amiodarone 6/2018, which reduced AF frequency but did not eliminate it.   14 day event monitor worn in 1/2018 showed a 27% AF burden. An echo performed in 1/2018 showed an EF of 60%, LA diameter 4 cm, septal wall thickness 1 cm, and no significant valvular disease.   Due to breakthrough AF on amiodarone, PVI was planned.    He underwent successful PVI (RFA) on 10/11/2018. Stopped amiodarone January 2019.  On 3/26/2019, Mr. Miller presented to [facility] with palpitations and lightheadedness, and was found to be in atypical atrial flutter at 116 bpm. Subsequent ECGs show sinus rhythm with episodes of nonsustained AF, as well as telemetry showing sinus pauses up to 6.1 seconds (asymptomatic). His breathing has worsened significantly since going back into AF.  Planned for redo PVI and started flecainide. 4/1/2019. He will likely need a pacemaker down the road given the evidence of sick sinus syndrome he displayed during his recent hospitalization.      Update (05/30/2019):    Underwent redo PVI on 4/16/2019:  1) History of PVI, now with recurrent atypical atrial flutter.  2) All four PVs found to be isolated from the original ablation, with entrance and exit block confirmed.  3) Status-post LA roof line and low LA posterior line, with entrance and exit block of the LA posterior wall confirmed.  4) Status-post SVC isolation, with entrance and exit block confirmed.  5) Status-post CTI-line, with bidirectional block confirmed.    Today he says that he was doing well after the ablation until this past weekend when he was in AF for 3 days. Episodes start with heartburn and chest pain. He was also hypotensive, with SBP in the 70s. HR was in the 120s. He went to [facility] but returned to [place] on his own. He is currently wearing a 48 hr Holter monitor. 2-3L NC continuous oxygen. He says his BPs are SBP 120s at home. When he is in SR, he feels well.     He is currently taking apixaban 5mg BID for stroke prophylaxis and denies significant bleeding episodes. He is currently being treated with flecainide 100mg BID for rhythm control and metoprolol succinate 100mg BID for HR control.  Kidney function is stable, with a creatinine of 1.4 on 4/16/2019.    I have personally reviewed the patient's EKG today, which shows sinus rhythm with 1st degree AVB at 70bpm. DE interval is 280. QTc is 457.    Recent Cardiac Tests:    LISA (4/16/2019):  · No thrombus is present in the appendage. Normal appendage velocities.  · No thrombus present in the left atrium.  · Normal left ventricular systolic function. The estimated ejection fraction is 60%  · Normal right ventricular systolic function.  · Grade 2 aortic plaque present.     Current Outpatient Medications   Medication Sig    acetaminophen (TYLENOL) 325 MG tablet Take 2 tablets (650 mg total) by mouth every 6 (six) hours as needed.    albuterol (PROVENTIL) 5 mg/mL nebulizer solution Take 2.5 mg by nebulization every 6 (six)  hours as needed for Wheezing. Rescue    allopurinol (ZYLOPRIM) 300 MG tablet Take 300 mg by mouth once daily.     apixaban (ELIQUIS) 5 mg Tab Take 1 tablet (5 mg total) by mouth 2 (two) times daily.    aspirin (ECOTRIN) 81 MG EC tablet Take 81 mg by mouth once daily.    ergocalciferol (VITAMIN D2) 50,000 unit Cap Take 1,000 Units by mouth every 7 days.    escitalopram oxalate (LEXAPRO) 20 MG tablet Take 20 mg by mouth once daily.     fish oil-omega-3 fatty acids 300-1,000 mg capsule Take 2 g by mouth 2 (two) times daily.     flecainide (TAMBOCOR) 100 MG Tab Take 1 tablet (100 mg total) by mouth 2 (two) times daily.    folic acid (FOLVITE) 1 MG tablet Take 1,000 mcg by mouth once daily.    furosemide (LASIX) 20 MG tablet Take 20 mg by mouth 2 (two) times daily.    ibuprofen (ADVIL,MOTRIN) 400 MG tablet Take 1 tablet (400 mg total) by mouth every 8 (eight) hours as needed (moderate pain only for 5 days post procedure).    lidocaine-prilocaine (EMLA) cream lidocaine-prilocaine 2.5 %-2.5 % topical cream    magnesium oxide (MAG-OX) 400 mg tablet Take 1 tablet by mouth 2 (two) times daily.    meclizine (ANTIVERT) 25 mg tablet Take 25 mg by mouth every evening.     metoprolol succinate (TOPROL-XL) 100 MG 24 hr tablet Take 100 mg by mouth 2 (two) times daily.    pantoprazole (PROTONIX) 40 MG tablet Take 40 mg by mouth once daily.    pramipexole (MIRAPEX) 0.125 MG tablet Take 0.125 mg by mouth nightly as needed.    rosuvastatin (CRESTOR) 20 MG tablet Take 20 mg by mouth once daily.    tamsulosin (FLOMAX) 0.4 mg Cp24 Take 1 capsule (0.4 mg total) by mouth once daily.    tramadol (ULTRAM) 50 mg tablet TAKE 1 TABLET BY MOUTH EVERY 6 HOURS AS NEEDED FOR PAIN    glimepiride (AMARYL) 2 MG tablet Take 2 mg by mouth before breakfast.     No current facility-administered medications for this visit.        Review of Systems   Constitution: Negative for malaise/fatigue.   Cardiovascular: Positive for palpitations.  "Negative for chest pain, dyspnea on exertion, irregular heartbeat and leg swelling.   Respiratory: Negative for shortness of breath.    Hematologic/Lymphatic: Negative for bleeding problem.   Skin: Negative for rash.   Musculoskeletal: Negative for myalgias.   Gastrointestinal: Negative for hematemesis, hematochezia and nausea.   Genitourinary: Negative for hematuria.   Neurological: Negative for light-headedness.   Psychiatric/Behavioral: Negative for altered mental status.   Allergic/Immunologic: Negative for persistent infections.     Objective:          BP (!) 155/77   Pulse 70   Ht 5' 10" (1.778 m)   Wt 132.3 kg (291 lb 10.7 oz)   BMI 41.85 kg/m²     Physical Exam   Constitutional: He is oriented to person, place, and time. He appears well-developed and well-nourished.   HENT:   Head: Normocephalic.   Nose: Nose normal.   Eyes: Pupils are equal, round, and reactive to light.   Cardiovascular: Normal rate, regular rhythm, S1 normal and S2 normal.   No murmur heard.  Pulses:       Radial pulses are 2+ on the right side, and 2+ on the left side.   Pulmonary/Chest: Breath sounds normal. No respiratory distress.   Abdominal: Normal appearance.   Musculoskeletal: Normal range of motion. He exhibits no edema.   Neurological: He is alert and oriented to person, place, and time.   Skin: Skin is warm and dry. No erythema.   Psychiatric: He has a normal mood and affect. His speech is normal and behavior is normal.   Nursing note and vitals reviewed.    Lab Results   Component Value Date     04/11/2019    K 4.0 04/11/2019    BUN 18 04/11/2019    CREATININE 1.4 04/16/2019    ALT 31 03/25/2019    AST 32 03/25/2019    HGB 11.8 (L) 04/16/2019    HCT 36.7 (L) 04/16/2019       Recent Labs   Lab 05/08/18  1200 05/21/18  1207 04/11/19  1020   INR 1.1 1.0 1.2       Assessment:     1. Paroxysmal atrial fibrillation    2. Essential hypertension    3. S/P ablation of atrial fibrillation      Plan:     In summary, Mr. Miller" is a 68 y.o. male with pAF, HTN, HLD, DM, ISIDRO, TIA (s/p rt CEA), and obesity here for follow up after ablation.   He was doing well after his redo ablation until this weekend, when he went to the hospital after 3 days of AF. He cardioverted on his own. Remains on flecainide and eliquis. Still in blanking period. Will consult with Dr. Potter for next steps. Patient says he knows that a pacemaker might be in his future for SSS (with AVN RFA if arrhythmia returns? Pt says he does not want any more ablations). Because this was only episode s/p PVI, will make no changes today.     Continue current medications.  RTC in 6 weeks with Dr. Potter, sooner if needed.    *A copy of this note has been sent to Dr. Potter*    Follow up in about 6 weeks (around 7/11/2019).    ------------------------------------------------------------------    NOEMI Rosenbaum, NP-C  Cardiac Electrophysiology

## 2019-05-30 ENCOUNTER — HOSPITAL ENCOUNTER (OUTPATIENT)
Dept: CARDIOLOGY | Facility: CLINIC | Age: 69
Discharge: HOME | End: 2019-05-30
Payer: MEDICARE

## 2019-05-30 ENCOUNTER — OFFICE VISIT (OUTPATIENT)
Dept: ELECTROPHYSIOLOGY | Facility: CLINIC | Age: 69
End: 2019-05-30
Payer: MEDICARE

## 2019-05-30 ENCOUNTER — TELEPHONE (OUTPATIENT)
Dept: ELECTROPHYSIOLOGY | Facility: CLINIC | Age: 69
End: 2019-05-30

## 2019-05-30 VITALS
HEIGHT: 70 IN | DIASTOLIC BLOOD PRESSURE: 77 MMHG | WEIGHT: 291.69 LBS | HEART RATE: 70 BPM | SYSTOLIC BLOOD PRESSURE: 155 MMHG | BODY MASS INDEX: 41.76 KG/M2

## 2019-05-30 DIAGNOSIS — I48.0 PAROXYSMAL ATRIAL FIBRILLATION: Primary | ICD-10-CM

## 2019-05-30 DIAGNOSIS — I10 ESSENTIAL HYPERTENSION: ICD-10-CM

## 2019-05-30 DIAGNOSIS — Z86.79 S/P ABLATION OF ATRIAL FIBRILLATION: ICD-10-CM

## 2019-05-30 DIAGNOSIS — I48.0 PAF (PAROXYSMAL ATRIAL FIBRILLATION): ICD-10-CM

## 2019-05-30 DIAGNOSIS — Z98.890 S/P ABLATION OF ATRIAL FIBRILLATION: ICD-10-CM

## 2019-05-30 PROCEDURE — 99999 PR PBB SHADOW E&M-EST. PATIENT-LVL III: CPT | Mod: PBBFAC,,, | Performed by: NURSE PRACTITIONER

## 2019-05-30 PROCEDURE — 99214 PR OFFICE/OUTPT VISIT, EST, LEVL IV, 30-39 MIN: ICD-10-PCS | Mod: S$GLB,,, | Performed by: NURSE PRACTITIONER

## 2019-05-30 PROCEDURE — 3078F DIAST BP <80 MM HG: CPT | Mod: CPTII,S$GLB,, | Performed by: NURSE PRACTITIONER

## 2019-05-30 PROCEDURE — 1101F PT FALLS ASSESS-DOCD LE1/YR: CPT | Mod: CPTII,S$GLB,, | Performed by: NURSE PRACTITIONER

## 2019-05-30 PROCEDURE — 93005 ELECTROCARDIOGRAM TRACING: CPT | Mod: S$GLB,,, | Performed by: INTERNAL MEDICINE

## 2019-05-30 PROCEDURE — 93010 RHYTHM STRIP: ICD-10-PCS | Mod: S$GLB,,, | Performed by: INTERNAL MEDICINE

## 2019-05-30 PROCEDURE — 99999 PR PBB SHADOW E&M-EST. PATIENT-LVL III: ICD-10-PCS | Mod: PBBFAC,,, | Performed by: NURSE PRACTITIONER

## 2019-05-30 PROCEDURE — 3077F SYST BP >= 140 MM HG: CPT | Mod: CPTII,S$GLB,, | Performed by: NURSE PRACTITIONER

## 2019-05-30 PROCEDURE — 3078F PR MOST RECENT DIASTOLIC BLOOD PRESSURE < 80 MM HG: ICD-10-PCS | Mod: CPTII,S$GLB,, | Performed by: NURSE PRACTITIONER

## 2019-05-30 PROCEDURE — 93005 RHYTHM STRIP: ICD-10-PCS | Mod: S$GLB,,, | Performed by: INTERNAL MEDICINE

## 2019-05-30 PROCEDURE — 1101F PR PT FALLS ASSESS DOC 0-1 FALLS W/OUT INJ PAST YR: ICD-10-PCS | Mod: CPTII,S$GLB,, | Performed by: NURSE PRACTITIONER

## 2019-05-30 PROCEDURE — 3077F PR MOST RECENT SYSTOLIC BLOOD PRESSURE >= 140 MM HG: ICD-10-PCS | Mod: CPTII,S$GLB,, | Performed by: NURSE PRACTITIONER

## 2019-05-30 PROCEDURE — 93010 ELECTROCARDIOGRAM REPORT: CPT | Mod: S$GLB,,, | Performed by: INTERNAL MEDICINE

## 2019-05-30 PROCEDURE — 99214 OFFICE O/P EST MOD 30 MIN: CPT | Mod: S$GLB,,, | Performed by: NURSE PRACTITIONER

## 2019-05-30 RX ORDER — FLECAINIDE ACETATE 150 MG/1
150 TABLET ORAL 2 TIMES DAILY
Qty: 60 TABLET | Refills: 11 | Status: SHIPPED | OUTPATIENT
Start: 2019-05-30 | End: 2020-07-13

## 2019-05-30 NOTE — TELEPHONE ENCOUNTER
----- Message from Murtaza Potter MD sent at 5/30/2019  4:32 PM CDT -----  I think increasing to 150 mg bid is reasonable.    GP    ----- Message -----  From: Arlyn Morales NP  Sent: 5/30/2019  12:23 PM  To: Murtaza Potter MD    Do you want to increase his flecainide to 150 or just have him wait out the blanking period? He is returning to see you in 6 weeks. MARY Joya

## 2019-05-30 NOTE — Clinical Note
Do you want to increase his flecainide to 150 or just have him wait out the blanking period? He is returning to see you in 6 weeks. MRAY Joya

## 2019-05-30 NOTE — TELEPHONE ENCOUNTER
Called Pt to advise of increase in flecainide to 150 mg BID. Pt voiced understanding. Rx sent to [facility] in [place].

## 2019-06-13 ENCOUNTER — HOSPITAL ENCOUNTER (OUTPATIENT)
Dept: RADIOLOGY | Facility: HOSPITAL | Age: 69
Discharge: HOME | End: 2019-06-13
Attending: ORTHOPAEDIC SURGERY
Payer: MEDICARE

## 2019-06-13 DIAGNOSIS — M25.562 BILATERAL KNEE PAIN: ICD-10-CM

## 2019-06-13 DIAGNOSIS — M25.562 PAIN IN LEFT KNEE: ICD-10-CM

## 2019-06-13 DIAGNOSIS — M25.561 BILATERAL KNEE PAIN: ICD-10-CM

## 2019-06-13 DIAGNOSIS — M25.561 PAIN IN RIGHT KNEE: Primary | ICD-10-CM

## 2019-06-13 PROCEDURE — 73564 X-RAY EXAM KNEE 4 OR MORE: CPT | Mod: 50,TC

## 2019-07-21 NOTE — PROGRESS NOTES
Subjective:     HPI    PCP: John López MD  Cardiologist: Magdalena Webster MD    I had the pleasure of seeing Daniel Miller in follow-up for his history of atrial fibrillation and PVI. He is a 69 year old male with a history of HTN, HLD, DM2, ISIDRO on CPAP, home O2, TIAs in 2005 which eventually led to a right carotid endarterectomy, and obesity, whose history of AF dates back to 1/2017 when he was brought to the [facility] for food poisoning and was found to be in AF. He had paroxysms of AF during that hospitalization. He was started on Eliquis around this time. Mr. Miller continued to have regular episodes of AF, which he thought were occurring almost daily. Associated symptoms include fatigue, shortness of breath, and tremulousness. He was started on Amiodarone, which reduced AF frequency but did not eliminate it. A 14 day event monitor worn in 1/2018 showed a 27% AF burden. An echo performed in 1/2018 showed an EF of 60%, LA diameter 4 cm, septal wall thickness 1 cm, and no significant valvular disease.     In 10/2018, a PVI was performed. At his 12/2018 visit Mr. Miller was feeling well, with no symptomatic AF recurrences. He did admit to a new rash which he attributed to Amiodarone. Amiodarone was stopped at that visit.    When I saw Mr. Miller in 3/2019 he denied AF recurrences. However, on 3/26/2019, Mr. Miller presented to [facility] with palpitations and lightheadedness, and was found to be in atypical atrial flutter at 116 bpm. Subsequent ECGs showed sinus rhythm with episodes of nonsustained AF, as well as telemetry showing sinus pauses up to 6.1 seconds (asymptomatic). His breathing worsened significantly when he went back into AF.    In 4/2019 a re-do PVI was performed. All four PVs were isolated from the original procedure. An LA posterior wall isolation, SVC isolation, and CTI-line were performed. Roughly 1 month later he had a single episode of AF lasting several  hours. When he was seen in 5/2019 the plan was to hold the course and continue Flecainide 150 mg bid.    Mr. Miller has had no recurrent AF since his last visit with me. He is currently suffering from food poisoning.     My interpretation of today's ECG is sinus rhythm at 73 bpm.    Review of Systems   Constitution: Positive for malaise/fatigue. Negative for decreased appetite, weight gain and weight loss.   HENT: Negative for sore throat.    Eyes: Negative for blurred vision.   Cardiovascular: Negative for chest pain, dyspnea on exertion, irregular heartbeat, leg swelling, near-syncope, orthopnea, palpitations, paroxysmal nocturnal dyspnea and syncope.   Respiratory: Positive for shortness of breath.    Skin: Negative for rash.   Musculoskeletal: Negative for arthritis.   Gastrointestinal: Negative for abdominal pain.   Neurological: Positive for tremors. Negative for focal weakness.   Psychiatric/Behavioral: Negative for altered mental status.        Objective:    Physical Exam   Constitutional: He is oriented to person, place, and time. He appears well-developed and well-nourished. No distress.   HENT:   Head: Normocephalic and atraumatic.   Mouth/Throat: Oropharynx is clear and moist.   Eyes: Pupils are equal, round, and reactive to light. No scleral icterus.   Neck: Neck supple. No thyromegaly present.   Cardiovascular: Regular rhythm, normal heart sounds and normal pulses. Exam reveals no gallop and no friction rub.   No murmur heard.  Pulmonary/Chest: Effort normal and breath sounds normal. He has no rales.   Abdominal: Soft. Bowel sounds are normal. He exhibits no distension. There is no tenderness.   Musculoskeletal: He exhibits no edema.   Neurological: He is alert and oriented to person, place, and time.   Skin: Skin is warm and dry. No rash noted.   Psychiatric: He has a normal mood and affect. His behavior is normal.         Assessment:       1. Paroxysmal atrial fibrillation    2. S/P ablation of atrial  fibrillation    3. Essential hypertension    4. Dyslipidemia    5. History of right-sided carotid endarterectomy    6. Current use of long term anticoagulation    7. Diabetes mellitus with coincident hypertension    8. Obesity, Class III, BMI 40-49.9 (morbid obesity)    9. LONGO (nonalcoholic steatohepatitis)    10. Long-term use of immunosuppressant medication         Plan:       In summary, Daniel Miller is a 69 year old male with a history of PAF refractory to Amiodarone. His [id number] Score is 6 (age, vascular disease, HTN, TIA, DM2) so he should remain on anticoagulation indefinitely. Mr. Miller is now status-post PVIx2, with only a single episode of AF which occurred one month after the procedure. The plan is to continue Flecainide and Eliquis, and to see me again in 3 months.    Thank you for allowing me to participate in the care of this patient. Please do not hesitate to call me with any questions or concerns.

## 2019-07-22 ENCOUNTER — HOSPITAL ENCOUNTER (OUTPATIENT)
Dept: CARDIOLOGY | Facility: CLINIC | Age: 69
Discharge: HOME | End: 2019-07-22
Payer: MEDICARE

## 2019-07-22 ENCOUNTER — OFFICE VISIT (OUTPATIENT)
Dept: ELECTROPHYSIOLOGY | Facility: CLINIC | Age: 69
End: 2019-07-22
Payer: MEDICARE

## 2019-07-22 VITALS
DIASTOLIC BLOOD PRESSURE: 78 MMHG | HEIGHT: 70 IN | WEIGHT: 290.38 LBS | BODY MASS INDEX: 41.57 KG/M2 | HEART RATE: 73 BPM | SYSTOLIC BLOOD PRESSURE: 138 MMHG

## 2019-07-22 DIAGNOSIS — I10 DIABETES MELLITUS WITH COINCIDENT HYPERTENSION: ICD-10-CM

## 2019-07-22 DIAGNOSIS — Z98.890 HISTORY OF RIGHT-SIDED CAROTID ENDARTERECTOMY: ICD-10-CM

## 2019-07-22 DIAGNOSIS — Z98.890 S/P ABLATION OF ATRIAL FIBRILLATION: ICD-10-CM

## 2019-07-22 DIAGNOSIS — E11.9 DIABETES MELLITUS WITH COINCIDENT HYPERTENSION: ICD-10-CM

## 2019-07-22 DIAGNOSIS — K75.81 NASH (NONALCOHOLIC STEATOHEPATITIS): ICD-10-CM

## 2019-07-22 DIAGNOSIS — I48.0 PAF (PAROXYSMAL ATRIAL FIBRILLATION): ICD-10-CM

## 2019-07-22 DIAGNOSIS — Z79.60 LONG-TERM USE OF IMMUNOSUPPRESSANT MEDICATION: ICD-10-CM

## 2019-07-22 DIAGNOSIS — E78.5 DYSLIPIDEMIA: ICD-10-CM

## 2019-07-22 DIAGNOSIS — Z86.79 S/P ABLATION OF ATRIAL FIBRILLATION: ICD-10-CM

## 2019-07-22 DIAGNOSIS — I48.0 PAROXYSMAL ATRIAL FIBRILLATION: Primary | ICD-10-CM

## 2019-07-22 DIAGNOSIS — Z79.01 CURRENT USE OF LONG TERM ANTICOAGULATION: ICD-10-CM

## 2019-07-22 DIAGNOSIS — E66.01 OBESITY, CLASS III, BMI 40-49.9 (MORBID OBESITY): ICD-10-CM

## 2019-07-22 DIAGNOSIS — I10 ESSENTIAL HYPERTENSION: ICD-10-CM

## 2019-07-22 PROCEDURE — 93010 ELECTROCARDIOGRAM REPORT: CPT | Mod: S$GLB,,, | Performed by: INTERNAL MEDICINE

## 2019-07-22 PROCEDURE — 1101F PT FALLS ASSESS-DOCD LE1/YR: CPT | Mod: CPTII,S$GLB,, | Performed by: INTERNAL MEDICINE

## 2019-07-22 PROCEDURE — 3075F SYST BP GE 130 - 139MM HG: CPT | Mod: CPTII,S$GLB,, | Performed by: INTERNAL MEDICINE

## 2019-07-22 PROCEDURE — 99214 OFFICE O/P EST MOD 30 MIN: CPT | Mod: S$GLB,,, | Performed by: INTERNAL MEDICINE

## 2019-07-22 PROCEDURE — 99214 PR OFFICE/OUTPT VISIT, EST, LEVL IV, 30-39 MIN: ICD-10-PCS | Mod: S$GLB,,, | Performed by: INTERNAL MEDICINE

## 2019-07-22 PROCEDURE — 99999 PR PBB SHADOW E&M-EST. PATIENT-LVL IV: CPT | Mod: PBBFAC,,, | Performed by: INTERNAL MEDICINE

## 2019-07-22 PROCEDURE — 93005 RHYTHM STRIP: ICD-10-PCS | Mod: S$GLB,,, | Performed by: INTERNAL MEDICINE

## 2019-07-22 PROCEDURE — 3078F PR MOST RECENT DIASTOLIC BLOOD PRESSURE < 80 MM HG: ICD-10-PCS | Mod: CPTII,S$GLB,, | Performed by: INTERNAL MEDICINE

## 2019-07-22 PROCEDURE — 3078F DIAST BP <80 MM HG: CPT | Mod: CPTII,S$GLB,, | Performed by: INTERNAL MEDICINE

## 2019-07-22 PROCEDURE — 99999 PR PBB SHADOW E&M-EST. PATIENT-LVL IV: ICD-10-PCS | Mod: PBBFAC,,, | Performed by: INTERNAL MEDICINE

## 2019-07-22 PROCEDURE — 93005 ELECTROCARDIOGRAM TRACING: CPT | Mod: S$GLB,,, | Performed by: INTERNAL MEDICINE

## 2019-07-22 PROCEDURE — 3075F PR MOST RECENT SYSTOLIC BLOOD PRESS GE 130-139MM HG: ICD-10-PCS | Mod: CPTII,S$GLB,, | Performed by: INTERNAL MEDICINE

## 2019-07-22 PROCEDURE — 1101F PR PT FALLS ASSESS DOC 0-1 FALLS W/OUT INJ PAST YR: ICD-10-PCS | Mod: CPTII,S$GLB,, | Performed by: INTERNAL MEDICINE

## 2019-07-22 PROCEDURE — 93010 RHYTHM STRIP: ICD-10-PCS | Mod: S$GLB,,, | Performed by: INTERNAL MEDICINE

## 2019-07-22 RX ORDER — AZITHROMYCIN 500 MG/1
500 TABLET, FILM COATED ORAL
COMMUNITY
End: 2019-10-28

## 2019-07-22 RX ORDER — ONDANSETRON 4 MG/1
4 TABLET, ORALLY DISINTEGRATING ORAL
COMMUNITY
End: 2021-02-23

## 2019-10-28 ENCOUNTER — OFFICE VISIT (OUTPATIENT)
Dept: ELECTROPHYSIOLOGY | Facility: CLINIC | Age: 69
End: 2019-10-28
Payer: MEDICARE

## 2019-10-28 ENCOUNTER — HOSPITAL ENCOUNTER (OUTPATIENT)
Dept: CARDIOLOGY | Facility: CLINIC | Age: 69
Discharge: HOME | End: 2019-10-28
Payer: MEDICARE

## 2019-10-28 VITALS
SYSTOLIC BLOOD PRESSURE: 122 MMHG | WEIGHT: 285.06 LBS | BODY MASS INDEX: 40.81 KG/M2 | HEIGHT: 70 IN | DIASTOLIC BLOOD PRESSURE: 74 MMHG | HEART RATE: 60 BPM

## 2019-10-28 DIAGNOSIS — Z98.890 S/P ABLATION OF ATRIAL FIBRILLATION: ICD-10-CM

## 2019-10-28 DIAGNOSIS — N18.9 CHRONIC KIDNEY DISEASE, UNSPECIFIED CKD STAGE: ICD-10-CM

## 2019-10-28 DIAGNOSIS — Z95.0 CARDIAC PACEMAKER IN SITU: ICD-10-CM

## 2019-10-28 DIAGNOSIS — I48.0 PAF (PAROXYSMAL ATRIAL FIBRILLATION): ICD-10-CM

## 2019-10-28 DIAGNOSIS — E66.01 OBESITY, CLASS III, BMI 40-49.9 (MORBID OBESITY): ICD-10-CM

## 2019-10-28 DIAGNOSIS — I48.0 PAROXYSMAL ATRIAL FIBRILLATION: Primary | ICD-10-CM

## 2019-10-28 DIAGNOSIS — Z79.01 CURRENT USE OF LONG TERM ANTICOAGULATION: ICD-10-CM

## 2019-10-28 DIAGNOSIS — I10 DIABETES MELLITUS WITH COINCIDENT HYPERTENSION: ICD-10-CM

## 2019-10-28 DIAGNOSIS — I48.19 PERSISTENT ATRIAL FIBRILLATION: ICD-10-CM

## 2019-10-28 DIAGNOSIS — E78.5 DYSLIPIDEMIA: ICD-10-CM

## 2019-10-28 DIAGNOSIS — E11.9 DIABETES MELLITUS WITH COINCIDENT HYPERTENSION: ICD-10-CM

## 2019-10-28 DIAGNOSIS — I10 ESSENTIAL HYPERTENSION: ICD-10-CM

## 2019-10-28 DIAGNOSIS — Z98.890 HISTORY OF RIGHT-SIDED CAROTID ENDARTERECTOMY: ICD-10-CM

## 2019-10-28 DIAGNOSIS — Z86.79 S/P ABLATION OF ATRIAL FIBRILLATION: ICD-10-CM

## 2019-10-28 DIAGNOSIS — Z79.60 LONG-TERM USE OF IMMUNOSUPPRESSANT MEDICATION: ICD-10-CM

## 2019-10-28 DIAGNOSIS — K75.81 NASH (NONALCOHOLIC STEATOHEPATITIS): ICD-10-CM

## 2019-10-28 PROCEDURE — 93010 RHYTHM STRIP: ICD-10-PCS | Mod: S$GLB,,, | Performed by: INTERNAL MEDICINE

## 2019-10-28 PROCEDURE — 93010 ELECTROCARDIOGRAM REPORT: CPT | Mod: S$GLB,,, | Performed by: INTERNAL MEDICINE

## 2019-10-28 PROCEDURE — 99214 PR OFFICE/OUTPT VISIT, EST, LEVL IV, 30-39 MIN: ICD-10-PCS | Mod: S$GLB,,, | Performed by: INTERNAL MEDICINE

## 2019-10-28 PROCEDURE — 3074F SYST BP LT 130 MM HG: CPT | Mod: CPTII,S$GLB,, | Performed by: INTERNAL MEDICINE

## 2019-10-28 PROCEDURE — 1101F PR PT FALLS ASSESS DOC 0-1 FALLS W/OUT INJ PAST YR: ICD-10-PCS | Mod: CPTII,S$GLB,, | Performed by: INTERNAL MEDICINE

## 2019-10-28 PROCEDURE — 3078F PR MOST RECENT DIASTOLIC BLOOD PRESSURE < 80 MM HG: ICD-10-PCS | Mod: CPTII,S$GLB,, | Performed by: INTERNAL MEDICINE

## 2019-10-28 PROCEDURE — 1101F PT FALLS ASSESS-DOCD LE1/YR: CPT | Mod: CPTII,S$GLB,, | Performed by: INTERNAL MEDICINE

## 2019-10-28 PROCEDURE — 93005 ELECTROCARDIOGRAM TRACING: CPT | Mod: S$GLB,,, | Performed by: INTERNAL MEDICINE

## 2019-10-28 PROCEDURE — 99999 PR PBB SHADOW E&M-EST. PATIENT-LVL IV: ICD-10-PCS | Mod: PBBFAC,,, | Performed by: INTERNAL MEDICINE

## 2019-10-28 PROCEDURE — 93005 RHYTHM STRIP: ICD-10-PCS | Mod: S$GLB,,, | Performed by: INTERNAL MEDICINE

## 2019-10-28 PROCEDURE — 3074F PR MOST RECENT SYSTOLIC BLOOD PRESSURE < 130 MM HG: ICD-10-PCS | Mod: CPTII,S$GLB,, | Performed by: INTERNAL MEDICINE

## 2019-10-28 PROCEDURE — 99214 OFFICE O/P EST MOD 30 MIN: CPT | Mod: S$GLB,,, | Performed by: INTERNAL MEDICINE

## 2019-10-28 PROCEDURE — 3078F DIAST BP <80 MM HG: CPT | Mod: CPTII,S$GLB,, | Performed by: INTERNAL MEDICINE

## 2019-10-28 PROCEDURE — 99999 PR PBB SHADOW E&M-EST. PATIENT-LVL IV: CPT | Mod: PBBFAC,,, | Performed by: INTERNAL MEDICINE

## 2019-10-28 NOTE — PROGRESS NOTES
Subjective:     HPI    PCP: John López MD  Cardiologist: Magdalena Webster MD    I had the pleasure of seeing Daniel Miller in follow-up for his history of atrial fibrillation and PVI. He is a 69 year old male with a history of HTN, HLD, DM2, ISIDRO on CPAP, home O2, TIAs in 2005 which eventually led to a right carotid endarterectomy, and obesity, whose history of AF dates back to 1/2017 when he was brought to the [facility] for food poisoning and was found to be in AF. He had paroxysms of AF during that hospitalization. He was started on Eliquis around this time. Mr. Miller continued to have regular episodes of AF, which he thought were occurring almost daily. Associated symptoms include fatigue, shortness of breath, and tremulousness. He was started on Amiodarone, which reduced AF frequency but did not eliminate it. A 14 day event monitor worn in 1/2018 showed a 27% AF burden. An echo performed in 1/2018 showed an EF of 60%, LA diameter 4 cm, septal wall thickness 1 cm, and no significant valvular disease.     In 10/2018, a PVI was performed. At his 12/2018 visit Mr. Miller was feeling well, with no symptomatic AF recurrences. He did admit to a new rash which he attributed to Amiodarone. Amiodarone was stopped at that visit.    When I saw Mr. Miller in 3/2019 he denied AF recurrences. However, on 3/26/2019, Mr. Miller presented to [facility] with palpitations and lightheadedness, and was found to be in atypical atrial flutter at 116 bpm. Subsequent ECGs showed sinus rhythm with episodes of nonsustained AF, as well as telemetry showing sinus pauses up to 6.1 seconds (asymptomatic). His breathing worsened significantly when he went back into AF.    In 4/2019 a re-do PVI was performed. All four PVs were isolated from the original procedure. An LA posterior wall isolation, SVC isolation, and CTI-line were performed. Roughly 1 month later he had a single episode of AF lasting several  hours. When he was seen in 5/2019 the plan was to hold the course and continue Flecainide 150 mg bid. At his 7/2019 visit he denied AF recurrences, and the plan was to hold the course. A [employer] dual chamber pacemaker was implanted 8/19/2019 following an episode of syncope 2/2 bradycardia.    A limited device interrogation performed today shows no AF and 18% RV pacing.    My interpretation of today's ECG is sinus rhythm with RV pacing at 60 bpm.    Review of Systems   Constitution: Positive for malaise/fatigue. Negative for decreased appetite, weight gain and weight loss.   HENT: Negative for sore throat.    Eyes: Negative for blurred vision.   Cardiovascular: Negative for chest pain, dyspnea on exertion, irregular heartbeat, leg swelling, near-syncope, orthopnea, palpitations, paroxysmal nocturnal dyspnea and syncope.   Respiratory: Positive for shortness of breath.    Skin: Negative for rash.   Musculoskeletal: Negative for arthritis.   Gastrointestinal: Negative for abdominal pain.   Neurological: Positive for tremors. Negative for focal weakness.   Psychiatric/Behavioral: Negative for altered mental status.        Objective:    Physical Exam   Constitutional: He is oriented to person, place, and time. He appears well-developed and well-nourished. No distress.   HENT:   Head: Normocephalic and atraumatic.   Mouth/Throat: Oropharynx is clear and moist.   Eyes: Pupils are equal, round, and reactive to light. No scleral icterus.   Neck: Neck supple. No thyromegaly present.   Cardiovascular: Regular rhythm, normal heart sounds and normal pulses. Exam reveals no gallop and no friction rub.   No murmur heard.  Pulmonary/Chest: Effort normal and breath sounds normal. He has no rales.   Abdominal: Soft. Bowel sounds are normal. He exhibits no distension. There is no tenderness.   Musculoskeletal: He exhibits no edema.   Neurological: He is alert and oriented to person, place, and time.   Skin: Skin is warm and dry. No  rash noted.   Psychiatric: He has a normal mood and affect. His behavior is normal.         Assessment:       1. Paroxysmal atrial fibrillation    2. Essential hypertension    3. Dyslipidemia    4. S/P ablation of atrial fibrillation    5. Persistent atrial fibrillation    6. History of right-sided carotid endarterectomy    7. Chronic kidney disease, unspecified CKD stage    8. Current use of long term anticoagulation    9. Diabetes mellitus with coincident hypertension    10. Obesity, Class III, BMI 40-49.9 (morbid obesity)    11. LONGO (nonalcoholic steatohepatitis)    12. Long-term use of immunosuppressant medication         Plan:       In summary, Daniel Miller is a 69 year old male with a history of PAF refractory to Amiodarone. His [id number] Score is 6 (age, vascular disease, HTN, TIA, DM2) so he should remain on anticoagulation indefinitely. Mr. Miller is now status-post PVIx2, with no recent episodes of AF. The plan is to hold the course, continue Flecainide and Eliquis, and see me again in 6 months. I have also extended paced and sensed AV intervals to reduce RV pacing.    He will see me again in 6 months.    Thank you for allowing me to participate in the care of this patient. Please do not hesitate to call me with any questions or concerns.

## 2020-04-08 ENCOUNTER — TELEPHONE (OUTPATIENT)
Dept: ELECTROPHYSIOLOGY | Facility: CLINIC | Age: 70
End: 2020-04-08

## 2020-04-08 NOTE — TELEPHONE ENCOUNTER
Spoke with patient concerning cancelling appointments on 4/27/2020 with  LOUISE Morales. He is interested in a virtual visual visit with Dr. Potter. He will download Leaf to his phone. He denies current cardiac/arrhythmia concerns at this time.

## 2020-09-11 PROBLEM — I27.20 PULMONARY HYPERTENSION: Status: ACTIVE | Noted: 2020-09-11

## 2020-09-11 PROBLEM — R91.8 PULMONARY NODULES: Status: ACTIVE | Noted: 2020-09-11

## 2020-09-11 PROBLEM — R09.02 HYPOXIC: Status: ACTIVE | Noted: 2020-09-11

## 2020-09-11 PROBLEM — R06.09 DYSPNEA ON EXERTION: Status: ACTIVE | Noted: 2020-09-11

## 2020-09-11 PROBLEM — G47.33 OSA ON CPAP: Status: ACTIVE | Noted: 2020-09-11

## 2020-10-01 PROBLEM — J84.9 ILD (INTERSTITIAL LUNG DISEASE): Status: ACTIVE | Noted: 2020-10-01

## 2022-02-23 DIAGNOSIS — D84.9 IMMUNOSUPPRESSED STATUS: ICD-10-CM

## (undated) DEVICE — NDL TRNSSPTL BRK-1 18GA 98CM

## (undated) DEVICE — PACK ARTHROSCOPY

## (undated) DEVICE — LINE PRESSURE MONITORING 96IN

## (undated) DEVICE — INTRO AGILIS MED CRL 8.5F 71CM

## (undated) DEVICE — INTRO 8.5FR 63CM SRO

## (undated) DEVICE — Device

## (undated) DEVICE — SEE MEDLINE ITEM 152523

## (undated) DEVICE — CHLORAPREP 10.5 ML APPLICATOR

## (undated) DEVICE — CATH THERMOCOOL SMTCH SF D F

## (undated) DEVICE — DRESSING XEROFORM FOIL PK 1X8

## (undated) DEVICE — CATH NAV PENTARAY ECO 7F

## (undated) DEVICE — SOL NACL IRR 3000ML

## (undated) DEVICE — CATH BIDIRECTIONAL DF CRV 7FR

## (undated) DEVICE — KIT PROBE COVER WITH GEL

## (undated) DEVICE — CATH TRICUSPID HALO XP 7FRX110

## (undated) DEVICE — DRAPE STERI U-SHAPED 47X51IN

## (undated) DEVICE — SOL LACTATED RINGERS 4000

## (undated) DEVICE — TUBE SET INFLOW/OUTFLOW

## (undated) DEVICE — PAD ABD 8X10 STERILE

## (undated) DEVICE — REPROCESSED CATH ACUNAV 8FR

## (undated) DEVICE — SHEATH INTRODUCER 9FR 11CM

## (undated) DEVICE — SYR 10CC LUER LOCK

## (undated) DEVICE — SET SMARTABLATE IRR TUBE

## (undated) DEVICE — INTRO FAST-CATH SL1 8.5FR 63CM

## (undated) DEVICE — PAD DEFIB CADENCE ADULT R2

## (undated) DEVICE — SEE MEDLINE ITEM 157117

## (undated) DEVICE — NDL TRNSSPTL BRK-1 18GA 71CM

## (undated) DEVICE — DRAPE FEMORAL ANGIOGRAPHY

## (undated) DEVICE — PATCH CARTO REFERENCE

## (undated) DEVICE — NDL ECLIPSE SAFETY 18GX1-1/2IN

## (undated) DEVICE — ELECTRODE POLYHESIVEPRE-ATTACH

## (undated) DEVICE — SUT MONOCRYL 4-0 PS-2

## (undated) DEVICE — IMMOB KNEE UNIV TRI PANEL 19IN

## (undated) DEVICE — INTRODUCER HEMOSTASIS 7.5F

## (undated) DEVICE — SHEATH HEMOSTASIS 8.5FR

## (undated) DEVICE — NDL HYPO REG 25G X 1 1/2

## (undated) DEVICE — CLOSURE SKIN STERI STRIP 1/2X4

## (undated) DEVICE — NDL SPINAL X-LONG 18GA

## (undated) DEVICE — ADHESIVE MASTISOL VIAL 48/BX

## (undated) DEVICE — TUBING SUCTION STERILE

## (undated) DEVICE — PACK EP DRAPE

## (undated) DEVICE — COVER DRAPE ACUSON STERILE

## (undated) DEVICE — SEE MEDLINE ITEM 152530